# Patient Record
Sex: MALE | Race: AMERICAN INDIAN OR ALASKA NATIVE | Employment: UNEMPLOYED
[De-identification: names, ages, dates, MRNs, and addresses within clinical notes are randomized per-mention and may not be internally consistent; named-entity substitution may affect disease eponyms.]

---

## 2018-01-09 ENCOUNTER — RECORDS - HEALTHEAST (OUTPATIENT)
Dept: ADMINISTRATIVE | Facility: OTHER | Age: 53
End: 2018-01-09

## 2018-09-19 ENCOUNTER — RECORDS - HEALTHEAST (OUTPATIENT)
Dept: ADMINISTRATIVE | Facility: OTHER | Age: 53
End: 2018-09-19

## 2018-09-26 ENCOUNTER — HOSPITAL ENCOUNTER (OUTPATIENT)
Dept: MRI IMAGING | Facility: CLINIC | Age: 53
Discharge: HOME | End: 2018-09-26

## 2018-09-26 DIAGNOSIS — M48.061 LUMBAR STENOSIS: ICD-10-CM

## 2018-09-26 DIAGNOSIS — M54.16 LUMBAR RADICULOPATHY: ICD-10-CM

## 2019-01-09 ENCOUNTER — OFFICE VISIT - HEALTHEAST (OUTPATIENT)
Dept: NEUROSURGERY | Facility: CLINIC | Age: 54
End: 2019-01-09

## 2019-01-09 DIAGNOSIS — M43.17 SPONDYLOLISTHESIS OF LUMBOSACRAL REGION: ICD-10-CM

## 2019-01-09 ASSESSMENT — MIFFLIN-ST. JEOR: SCORE: 1914.01

## 2019-02-15 ENCOUNTER — RECORDS - HEALTHEAST (OUTPATIENT)
Dept: ADMINISTRATIVE | Facility: OTHER | Age: 54
End: 2019-02-15

## 2019-03-12 ENCOUNTER — OFFICE VISIT - HEALTHEAST (OUTPATIENT)
Dept: NEUROSURGERY | Facility: CLINIC | Age: 54
End: 2019-03-12

## 2019-03-12 DIAGNOSIS — M43.17 SPONDYLOLISTHESIS OF LUMBOSACRAL REGION: ICD-10-CM

## 2019-03-12 ASSESSMENT — MIFFLIN-ST. JEOR: SCORE: 1914.01

## 2019-03-29 ENCOUNTER — AMBULATORY - HEALTHEAST (OUTPATIENT)
Dept: NEUROSURGERY | Facility: CLINIC | Age: 54
End: 2019-03-29

## 2019-03-29 DIAGNOSIS — Z01.818 PRE-OP EXAM: ICD-10-CM

## 2019-04-01 ENCOUNTER — RECORDS - HEALTHEAST (OUTPATIENT)
Dept: ADMINISTRATIVE | Facility: OTHER | Age: 54
End: 2019-04-01

## 2019-04-01 ENCOUNTER — COMMUNICATION - HEALTHEAST (OUTPATIENT)
Dept: NEUROSURGERY | Facility: CLINIC | Age: 54
End: 2019-04-01

## 2019-04-02 ENCOUNTER — COMMUNICATION - HEALTHEAST (OUTPATIENT)
Dept: NEUROSURGERY | Facility: CLINIC | Age: 54
End: 2019-04-02

## 2019-05-06 ENCOUNTER — AMBULATORY - HEALTHEAST (OUTPATIENT)
Dept: LAB | Facility: HOSPITAL | Age: 54
End: 2019-05-06

## 2019-05-06 DIAGNOSIS — Z01.818 PRE-OP EXAM: ICD-10-CM

## 2019-05-06 LAB
ALBUMIN UR-MCNC: NEGATIVE MG/DL
ANION GAP SERPL CALCULATED.3IONS-SCNC: 6 MMOL/L (ref 5–18)
APPEARANCE UR: CLEAR
APTT PPP: 28 SECONDS (ref 24–37)
BACTERIA #/AREA URNS HPF: ABNORMAL HPF
BILIRUB UR QL STRIP: NEGATIVE
BUN SERPL-MCNC: 11 MG/DL (ref 8–22)
CALCIUM SERPL-MCNC: 9.4 MG/DL (ref 8.5–10.5)
CHLORIDE BLD-SCNC: 107 MMOL/L (ref 98–107)
CLOSURE TME COLL+ADP BLD: 90 SEC (ref 1–110)
CLOSURE TME COLL+EPINEP BLD: >300 SEC (ref 1–180)
CO2 SERPL-SCNC: 30 MMOL/L (ref 22–31)
COLOR UR AUTO: YELLOW
CREAT SERPL-MCNC: 0.87 MG/DL (ref 0.7–1.3)
ERYTHROCYTE [DISTWIDTH] IN BLOOD BY AUTOMATED COUNT: 12.7 % (ref 11–14.5)
GFR SERPL CREATININE-BSD FRML MDRD: >60 ML/MIN/1.73M2
GLUCOSE BLD-MCNC: 109 MG/DL (ref 70–125)
GLUCOSE UR STRIP-MCNC: NEGATIVE MG/DL
HCT VFR BLD AUTO: 40.3 % (ref 40–54)
HGB BLD-MCNC: 13.6 G/DL (ref 14–18)
HGB UR QL STRIP: NEGATIVE
INR PPP: 0.98 (ref 0.9–1.1)
KETONES UR STRIP-MCNC: NEGATIVE MG/DL
LEUKOCYTE ESTERASE UR QL STRIP: NEGATIVE
MCH RBC QN AUTO: 30.3 PG (ref 27–34)
MCHC RBC AUTO-ENTMCNC: 33.7 G/DL (ref 32–36)
MCV RBC AUTO: 90 FL (ref 80–100)
MUCOUS THREADS #/AREA URNS LPF: ABNORMAL LPF
NITRATE UR QL: NEGATIVE
PH UR STRIP: 6.5 [PH] (ref 4.5–8)
PLATELET # BLD AUTO: 180 THOU/UL (ref 140–440)
PMV BLD AUTO: 9.8 FL (ref 8.5–12.5)
POTASSIUM BLD-SCNC: 4.2 MMOL/L (ref 3.5–5)
RBC # BLD AUTO: 4.49 MILL/UL (ref 4.4–6.2)
RBC #/AREA URNS AUTO: ABNORMAL HPF
SODIUM SERPL-SCNC: 143 MMOL/L (ref 136–145)
SP GR UR STRIP: 1.01 (ref 1–1.03)
SQUAMOUS #/AREA URNS AUTO: ABNORMAL LPF
UROBILINOGEN UR STRIP-ACNC: ABNORMAL
WBC #/AREA URNS AUTO: ABNORMAL HPF
WBC: 6 THOU/UL (ref 4–11)

## 2019-05-20 ENCOUNTER — ANESTHESIA - HEALTHEAST (OUTPATIENT)
Dept: SURGERY | Facility: CLINIC | Age: 54
End: 2019-05-20

## 2019-05-20 ENCOUNTER — COMMUNICATION - HEALTHEAST (OUTPATIENT)
Dept: NEUROSURGERY | Facility: CLINIC | Age: 54
End: 2019-05-20

## 2019-05-21 ENCOUNTER — SURGERY - HEALTHEAST (OUTPATIENT)
Dept: SURGERY | Facility: CLINIC | Age: 54
End: 2019-05-21

## 2019-05-21 ENCOUNTER — RECORDS - HEALTHEAST (OUTPATIENT)
Dept: ADMINISTRATIVE | Facility: OTHER | Age: 54
End: 2019-05-21

## 2019-05-21 ASSESSMENT — MIFFLIN-ST. JEOR: SCORE: 1909.48

## 2019-05-24 ENCOUNTER — COMMUNICATION - HEALTHEAST (OUTPATIENT)
Dept: NEUROSURGERY | Facility: CLINIC | Age: 54
End: 2019-05-24

## 2019-05-24 DIAGNOSIS — Z98.1 S/P LUMBAR FUSION: ICD-10-CM

## 2019-05-24 DIAGNOSIS — M43.16 SPONDYLOLISTHESIS OF LUMBAR REGION: ICD-10-CM

## 2019-06-04 ENCOUNTER — AMBULATORY - HEALTHEAST (OUTPATIENT)
Dept: NEUROSURGERY | Facility: CLINIC | Age: 54
End: 2019-06-04

## 2019-06-04 DIAGNOSIS — Z48.02 ENCOUNTER FOR STAPLE REMOVAL: ICD-10-CM

## 2019-06-20 ENCOUNTER — RECORDS - HEALTHEAST (OUTPATIENT)
Dept: ADMINISTRATIVE | Facility: OTHER | Age: 54
End: 2019-06-20

## 2019-07-02 ENCOUNTER — OFFICE VISIT - HEALTHEAST (OUTPATIENT)
Dept: NEUROSURGERY | Facility: CLINIC | Age: 54
End: 2019-07-02

## 2019-07-02 DIAGNOSIS — Z98.1 S/P LUMBAR FUSION: ICD-10-CM

## 2019-12-27 ENCOUNTER — COMMUNICATION - HEALTHEAST (OUTPATIENT)
Dept: NEUROSURGERY | Facility: CLINIC | Age: 54
End: 2019-12-27

## 2019-12-31 ENCOUNTER — RECORDS - HEALTHEAST (OUTPATIENT)
Dept: ADMINISTRATIVE | Facility: OTHER | Age: 54
End: 2019-12-31

## 2020-06-29 ENCOUNTER — COMMUNICATION - HEALTHEAST (OUTPATIENT)
Dept: NEUROSURGERY | Facility: CLINIC | Age: 55
End: 2020-06-29

## 2020-07-22 ENCOUNTER — RECORDS - HEALTHEAST (OUTPATIENT)
Dept: ADMINISTRATIVE | Facility: OTHER | Age: 55
End: 2020-07-22

## 2020-07-31 ENCOUNTER — COMMUNICATION - HEALTHEAST (OUTPATIENT)
Dept: FAMILY MEDICINE | Facility: CLINIC | Age: 55
End: 2020-07-31

## 2020-08-13 ENCOUNTER — COMMUNICATION - HEALTHEAST (OUTPATIENT)
Dept: TELEHEALTH | Facility: CLINIC | Age: 55
End: 2020-08-13

## 2020-08-13 ENCOUNTER — OFFICE VISIT - HEALTHEAST (OUTPATIENT)
Dept: INTERNAL MEDICINE | Facility: CLINIC | Age: 55
End: 2020-08-13

## 2020-08-13 DIAGNOSIS — M25.552 HIP PAIN, LEFT: ICD-10-CM

## 2020-08-13 DIAGNOSIS — M79.671 RIGHT FOOT PAIN: ICD-10-CM

## 2020-08-13 DIAGNOSIS — R73.03 PREDIABETES: ICD-10-CM

## 2020-08-13 DIAGNOSIS — E66.811 CLASS 1 OBESITY DUE TO EXCESS CALORIES WITHOUT SERIOUS COMORBIDITY WITH BODY MASS INDEX (BMI) OF 33.0 TO 33.9 IN ADULT: ICD-10-CM

## 2020-08-13 DIAGNOSIS — E66.09 CLASS 1 OBESITY DUE TO EXCESS CALORIES WITHOUT SERIOUS COMORBIDITY WITH BODY MASS INDEX (BMI) OF 33.0 TO 33.9 IN ADULT: ICD-10-CM

## 2020-08-13 DIAGNOSIS — Z12.5 PROSTATE CANCER SCREENING: ICD-10-CM

## 2020-08-13 LAB
ANION GAP SERPL CALCULATED.3IONS-SCNC: 8 MMOL/L (ref 5–18)
BUN SERPL-MCNC: 11 MG/DL (ref 8–22)
CALCIUM SERPL-MCNC: 9.4 MG/DL (ref 8.5–10.5)
CHLORIDE BLD-SCNC: 105 MMOL/L (ref 98–107)
CHOLEST SERPL-MCNC: 175 MG/DL
CO2 SERPL-SCNC: 26 MMOL/L (ref 22–31)
CREAT SERPL-MCNC: 1.2 MG/DL (ref 0.7–1.3)
CREAT UR-MCNC: 113.9 MG/DL
FASTING STATUS PATIENT QL REPORTED: NO
GFR SERPL CREATININE-BSD FRML MDRD: >60 ML/MIN/1.73M2
GLUCOSE BLD-MCNC: 110 MG/DL (ref 70–125)
HBA1C MFR BLD: 5.9 %
HDLC SERPL-MCNC: 43 MG/DL
LDLC SERPL CALC-MCNC: 104 MG/DL
LDLC SERPL CALC-MCNC: ABNORMAL MG/DL
MICROALBUMIN UR-MCNC: <0.5 MG/DL (ref 0–1.99)
MICROALBUMIN/CREAT UR: NORMAL MG/G{CREAT}
POTASSIUM BLD-SCNC: 4.9 MMOL/L (ref 3.5–5)
PSA SERPL-MCNC: 0.6 NG/ML (ref 0–3.5)
SODIUM SERPL-SCNC: 139 MMOL/L (ref 136–145)
TRIGL SERPL-MCNC: 446 MG/DL

## 2020-08-13 ASSESSMENT — MIFFLIN-ST. JEOR: SCORE: 1911.74

## 2020-08-21 ENCOUNTER — COMMUNICATION - HEALTHEAST (OUTPATIENT)
Dept: INTERNAL MEDICINE | Facility: CLINIC | Age: 55
End: 2020-08-21

## 2020-09-14 ENCOUNTER — COMMUNICATION - HEALTHEAST (OUTPATIENT)
Dept: VASCULAR SURGERY | Facility: CLINIC | Age: 55
End: 2020-09-14

## 2020-12-28 PROBLEM — I10 IDIOPATHIC HYPERTENSION: Status: ACTIVE | Noted: 2020-12-28

## 2020-12-28 PROBLEM — M54.16 LUMBAR RADICULOPATHY: Status: ACTIVE | Noted: 2020-12-28

## 2020-12-28 PROBLEM — E11.9 TYPE 2 DIABETES MELLITUS WITHOUT COMPLICATION, WITHOUT LONG-TERM CURRENT USE OF INSULIN (H): Status: ACTIVE | Noted: 2020-12-28

## 2020-12-28 PROBLEM — M43.10 SPONDYLOLISTHESIS: Status: ACTIVE | Noted: 2019-05-21

## 2020-12-28 PROBLEM — R50.9 FEVER: Status: ACTIVE | Noted: 2019-05-24

## 2020-12-28 RX ORDER — VENLAFAXINE 100 MG/1
100 TABLET ORAL 3 TIMES DAILY
COMMUNITY
Start: 2020-12-28 | End: 2020-12-30

## 2020-12-28 RX ORDER — CLONIDINE HYDROCHLORIDE 0.3 MG/1
0.3 TABLET ORAL DAILY
COMMUNITY
Start: 2020-06-25 | End: 2021-10-13

## 2020-12-28 RX ORDER — CALCIUM POLYCARBOPHIL 625 MG
625 TABLET ORAL DAILY
COMMUNITY
End: 2020-12-30

## 2020-12-28 RX ORDER — ACETAMINOPHEN 325 MG/1
650 TABLET ORAL EVERY 6 HOURS PRN
COMMUNITY
Start: 2020-12-28 | End: 2020-12-30

## 2020-12-28 RX ORDER — LURASIDONE HYDROCHLORIDE 40 MG/1
40 TABLET, FILM COATED ORAL DAILY
COMMUNITY
Start: 2020-08-03 | End: 2021-10-13

## 2020-12-28 RX ORDER — BENZALKONIUM CHLORIDE 1.3 MG/ML
CLOTH TOPICAL
COMMUNITY
Start: 2020-07-02

## 2020-12-28 RX ORDER — BUPROPION HYDROCHLORIDE 300 MG/1
300 TABLET ORAL DAILY
COMMUNITY
Start: 2020-08-03 | End: 2021-10-13

## 2020-12-28 RX ORDER — DOCUSATE SODIUM 100 MG/1
1 CAPSULE, LIQUID FILLED ORAL DAILY
COMMUNITY
Start: 2020-07-26 | End: 2020-12-30

## 2020-12-28 SDOH — HEALTH STABILITY: MENTAL HEALTH: HOW MANY STANDARD DRINKS CONTAINING ALCOHOL DO YOU HAVE ON A TYPICAL DAY?: NOT ASKED

## 2020-12-28 SDOH — HEALTH STABILITY: MENTAL HEALTH: HOW OFTEN DO YOU HAVE A DRINK CONTAINING ALCOHOL?: NEVER

## 2020-12-28 SDOH — HEALTH STABILITY: MENTAL HEALTH: HOW OFTEN DO YOU HAVE 6 OR MORE DRINKS ON ONE OCCASION?: NEVER

## 2020-12-30 ENCOUNTER — OFFICE VISIT (OUTPATIENT)
Dept: FAMILY MEDICINE | Facility: OTHER | Age: 55
End: 2020-12-30
Attending: FAMILY MEDICINE
Payer: COMMERCIAL

## 2020-12-30 VITALS
DIASTOLIC BLOOD PRESSURE: 80 MMHG | WEIGHT: 226 LBS | HEART RATE: 72 BPM | RESPIRATION RATE: 16 BRPM | TEMPERATURE: 97.2 F | SYSTOLIC BLOOD PRESSURE: 124 MMHG | HEIGHT: 71 IN | BODY MASS INDEX: 31.64 KG/M2 | OXYGEN SATURATION: 98 %

## 2020-12-30 DIAGNOSIS — N52.9 IMPOTENCE OF ORGANIC ORIGIN: ICD-10-CM

## 2020-12-30 DIAGNOSIS — E11.9 TYPE 2 DIABETES MELLITUS WITHOUT COMPLICATION, WITHOUT LONG-TERM CURRENT USE OF INSULIN (H): Primary | ICD-10-CM

## 2020-12-30 DIAGNOSIS — Z98.1 S/P LUMBAR FUSION: ICD-10-CM

## 2020-12-30 DIAGNOSIS — M12.811 ROTATOR CUFF ARTHROPATHY OF RIGHT SHOULDER: ICD-10-CM

## 2020-12-30 PROBLEM — R50.9 FEVER: Status: RESOLVED | Noted: 2019-05-24 | Resolved: 2020-12-30

## 2020-12-30 LAB
ALBUMIN SERPL-MCNC: 4 G/DL (ref 3.5–5.7)
ALP SERPL-CCNC: 78 U/L (ref 34–104)
ALT SERPL W P-5'-P-CCNC: 65 U/L (ref 7–52)
ANION GAP SERPL CALCULATED.3IONS-SCNC: 5 MMOL/L (ref 3–14)
AST SERPL W P-5'-P-CCNC: 41 U/L (ref 13–39)
BILIRUB SERPL-MCNC: 0.5 MG/DL (ref 0.3–1)
BUN SERPL-MCNC: 12 MG/DL (ref 7–25)
CALCIUM SERPL-MCNC: 8.7 MG/DL (ref 8.6–10.3)
CHLORIDE SERPL-SCNC: 108 MMOL/L (ref 98–107)
CO2 SERPL-SCNC: 28 MMOL/L (ref 21–31)
CREAT SERPL-MCNC: 1.25 MG/DL (ref 0.7–1.3)
GFR SERPL CREATININE-BSD FRML MDRD: 60 ML/MIN/{1.73_M2}
GLUCOSE SERPL-MCNC: 85 MG/DL (ref 70–105)
HBA1C MFR BLD: 6.6 % (ref 4–6)
POTASSIUM SERPL-SCNC: 4.2 MMOL/L (ref 3.5–5.1)
PROT SERPL-MCNC: 7.1 G/DL (ref 6.4–8.9)
SODIUM SERPL-SCNC: 141 MMOL/L (ref 134–144)

## 2020-12-30 PROCEDURE — 80053 COMPREHEN METABOLIC PANEL: CPT | Mod: ZL | Performed by: FAMILY MEDICINE

## 2020-12-30 PROCEDURE — 36415 COLL VENOUS BLD VENIPUNCTURE: CPT | Mod: ZL | Performed by: FAMILY MEDICINE

## 2020-12-30 PROCEDURE — G0463 HOSPITAL OUTPT CLINIC VISIT: HCPCS

## 2020-12-30 PROCEDURE — 83036 HEMOGLOBIN GLYCOSYLATED A1C: CPT | Mod: ZL | Performed by: FAMILY MEDICINE

## 2020-12-30 PROCEDURE — 99204 OFFICE O/P NEW MOD 45 MIN: CPT | Performed by: FAMILY MEDICINE

## 2020-12-30 RX ORDER — SILDENAFIL 50 MG/1
50 TABLET, FILM COATED ORAL DAILY PRN
Qty: 12 TABLET | Refills: 1 | Status: SHIPPED | OUTPATIENT
Start: 2020-12-30 | End: 2021-02-16

## 2020-12-30 RX ORDER — ATORVASTATIN CALCIUM 20 MG/1
20 TABLET, FILM COATED ORAL DAILY
Qty: 90 TABLET | Refills: 3 | Status: SHIPPED | OUTPATIENT
Start: 2020-12-30 | End: 2021-02-16

## 2020-12-30 ASSESSMENT — ANXIETY QUESTIONNAIRES
6. BECOMING EASILY ANNOYED OR IRRITABLE: NOT AT ALL
3. WORRYING TOO MUCH ABOUT DIFFERENT THINGS: NOT AT ALL
5. BEING SO RESTLESS THAT IT IS HARD TO SIT STILL: NOT AT ALL
2. NOT BEING ABLE TO STOP OR CONTROL WORRYING: NOT AT ALL
7. FEELING AFRAID AS IF SOMETHING AWFUL MIGHT HAPPEN: NOT AT ALL
GAD7 TOTAL SCORE: 0
1. FEELING NERVOUS, ANXIOUS, OR ON EDGE: NOT AT ALL

## 2020-12-30 ASSESSMENT — PATIENT HEALTH QUESTIONNAIRE - PHQ9
SUM OF ALL RESPONSES TO PHQ QUESTIONS 1-9: 0
5. POOR APPETITE OR OVEREATING: NOT AT ALL

## 2020-12-30 ASSESSMENT — MIFFLIN-ST. JEOR: SCORE: 1879.32

## 2020-12-30 ASSESSMENT — PAIN SCALES - GENERAL: PAINLEVEL: MODERATE PAIN (5)

## 2020-12-30 NOTE — LETTER
December 31, 2020      Lucas Lim  [facility] [address] [address]        Dear ,    We are writing to inform you of your test results.    As you can see your A1c did come back normal but your liver function tests were slightly elevated.  There can be a number of causes.  We can discuss the potential causes at your next visit.    Resulted Orders   Comprehensive Metabolic Panel   Result Value Ref Range    Sodium 141 134 - 144 mmol/L    Potassium 4.2 3.5 - 5.1 mmol/L    Chloride 108 (H) 98 - 107 mmol/L    Carbon Dioxide 28 21 - 31 mmol/L    Anion Gap 5 3 - 14 mmol/L    Glucose 85 70 - 105 mg/dL    Urea Nitrogen 12 7 - 25 mg/dL    Creatinine 1.25 0.70 - 1.30 mg/dL    GFR Estimate 60 (L) >60 mL/min/[1.73_m2]    GFR Estimate If Black 73 >60 mL/min/[1.73_m2]    Calcium 8.7 8.6 - 10.3 mg/dL    Bilirubin Total 0.5 0.3 - 1.0 mg/dL    Albumin 4.0 3.5 - 5.7 g/dL    Protein Total 7.1 6.4 - 8.9 g/dL    Alkaline Phosphatase 78 34 - 104 U/L    ALT 65 (H) 7 - 52 U/L    AST 41 (H) 13 - 39 U/L   Hemoglobin A1c   Result Value Ref Range    Hemoglobin A1C 6.6 (H) 4.0 - 6.0 %       If you have any questions or concerns, please call the clinic at the number listed above.       Sincerely,      Anson Rain MD

## 2020-12-30 NOTE — NURSING NOTE
Patient here for back pain after a fusion of L5 and S1 in 2019. He has not followed up with MD since Fusion. He would like to get a new prescription for metformin his last A!C 5.9 in August 2020 he was on the metformin. Also concerns with ED and a torn right rotator cuff. Medication Reconciliation: complete.    Abi White LPN  12/30/2020 2:20 PM

## 2020-12-31 PROBLEM — M12.811 ROTATOR CUFF ARTHROPATHY OF RIGHT SHOULDER: Status: ACTIVE | Noted: 2020-12-31

## 2020-12-31 ASSESSMENT — ANXIETY QUESTIONNAIRES: GAD7 TOTAL SCORE: 0

## 2020-12-31 NOTE — PROGRESS NOTES
SUBJECTIVE:   Lucas Lim is a 55 year old male who presents to clinic today for the following health issues: Back pain.  Erectile dysfunction.  Metformin refilled.  Shoulder pain.    Patient arrives here with the above complaints patient typically gets his care through [facility] [place] [facility].  He reports a history of back fusion in 2019.  He reports he periodically gets pain.  In the past he is used Naprosyn which reports it does not seem to help.  He also reports taking Mobic.  Most of times is okay unless he does any significant activity.  He currently rates the pain as a 5 out of 10.  He is also use gabapentin which is made him sleepy.  And Lyrica.  Is  is reviewed  Total Private Pay: 0    Fill Date ID   Written Drug Qty Days Prescriber Rx # Pharmacy Refill   Daily Dose* Pymt Type     11/27/2020  1   11/24/2020  Acetamin[facility]Cod #3 [facility]  [facility]   [id number]   [id number]   0  27.00 MME      Patient is also requesting a medication for ED.  He reports he is having increasing trouble with erectile dysfunction.  He has never been on any medications in the past.  He has shoulder pain.  Patient has had shots in the past.  Typically for a year.  He reports no history of MRI but he has had shoulder [facility].  He reports knowledge of grinding.  He has trouble with reaching above his head associated with pain.  Patient also has a history of diabetes.  And wishes a refill of his Metformin.  No recent A1c done.          Patient Active Problem List    Diagnosis Date Noted     Rotator cuff arthropathy of right shoulder 12/31/2020     Priority: Medium     S/P lumbar fusion 12/30/2020     Priority: Medium     Idiopathic hypertension 12/28/2020     Priority: Medium     Lumbar radiculopathy 12/28/2020     Priority: Medium     Type 2 diabetes mellitus without complication, without long-term current use of insulin (H) 12/28/2020     Priority: Medium     Spondylolisthesis 05/21/2019     Priority:  "Medium     Alcoholism (H) 12/31/2009     Priority: Medium     Sleep apnea 12/31/2009     Priority: Medium     Past Medical History:   Diagnosis Date     Acquired spondylolisthesis      Arthritis      Chronic low back pain      Diabetes mellitus (H)      Essential hypertension      Fever and chills      Lumbar radiculopathy      Major depressive disorder with current active episode      Mood swings      JERMAINE (obstructive sleep apnea)      Screening for hyperlipidemia      Tear of right rotator cuff      Type 2 diabetes mellitus without complication, without long-term current use of insulin (H)       Past Surgical History:   Procedure Laterality Date     HAND SURGERY Right     per care everywhere     SKIN GRAFT, EACH ADDN 100SQCM Left     per care everywhere     Family History   Problem Relation Age of Onset     Liver Disease Mother         alcohol     Myocardial Infarction Father      No Known Allergies    Review of Systems     OBJECTIVE:     /80   Pulse 72   Temp 97.2  F (36.2  C)   Resp 16   Ht 1.791 m (5' 10.5\")   Wt 102.5 kg (226 lb)   SpO2 98%   BMI 31.97 kg/m    Body mass index is 31.97 kg/m .  Physical Exam  Constitutional:       Appearance: Normal appearance.   Cardiovascular:      Rate and Rhythm: Normal rate and regular rhythm.      Heart sounds: No murmur.   Pulmonary:      Effort: Pulmonary effort is normal.      Breath sounds: Normal breath sounds.   Musculoskeletal: Normal range of motion.         General: Tenderness present. No swelling or deformity.      Comments: Patient has good range of motion associated with pain with abduction past 90 degrees.  Weakness with internal or external rotation to the right shoulder   Skin:     General: Skin is warm.   Neurological:      General: No focal deficit present.      Mental Status: He is alert.   Psychiatric:         Mood and Affect: Mood normal.         Diagnostic Test Results:  Results for orders placed or performed in visit on 12/30/20 "   Comprehensive Metabolic Panel     Status: Abnormal   Result Value Ref Range    Sodium 141 134 - 144 mmol/L    Potassium 4.2 3.5 - 5.1 mmol/L    Chloride 108 (H) 98 - 107 mmol/L    Carbon Dioxide 28 21 - 31 mmol/L    Anion Gap 5 3 - 14 mmol/L    Glucose 85 70 - 105 mg/dL    Urea Nitrogen 12 7 - 25 mg/dL    Creatinine 1.25 0.70 - 1.30 mg/dL    GFR Estimate 60 (L) >60 mL/min/[1.73_m2]    GFR Estimate If Black 73 >60 mL/min/[1.73_m2]    Calcium 8.7 8.6 - 10.3 mg/dL    Bilirubin Total 0.5 0.3 - 1.0 mg/dL    Albumin 4.0 3.5 - 5.7 g/dL    Protein Total 7.1 6.4 - 8.9 g/dL    Alkaline Phosphatase 78 34 - 104 U/L    ALT 65 (H) 7 - 52 U/L    AST 41 (H) 13 - 39 U/L   Hemoglobin A1c     Status: Abnormal   Result Value Ref Range    Hemoglobin A1C 6.6 (H) 4.0 - 6.0 %       ASSESSMENT/PLAN:         1. S/P lumbar fusion  Trial of Feldene.  Hopefully that will also improve his shoulder pain.  Advised to get records from [facility] and follow-up here in the clinic in about a month.  May need further evaluation of the shoulder.  Including MRI possible referral  - piroxicam (FELDENE) 20 MG capsule; Take 1 capsule (20 mg) by mouth daily Prn  Dispense: 30 capsule; Refill: 4    2. Type 2 diabetes mellitus without complication, without long-term current use of insulin (H)  Currently under fair control medications refilled.  - metFORMIN (GLUCOPHAGE) 1000 MG tablet; Take 1 tablet (1,000 mg) by mouth daily (with breakfast)  Dispense: 90 tablet; Refill: 3  - Hemoglobin A1c; Future  - Comprehensive Metabolic Panel; Future  - atorvastatin (LIPITOR) 20 MG tablet; Take 1 tablet (20 mg) by mouth daily  Dispense: 90 tablet; Refill: 3  - Comprehensive Metabolic Panel  - Hemoglobin A1c    3. Impotence of organic origin  Trial.  Follow-up in 1 month  - sildenafil (VIAGRA) 50 MG tablet; Take 1 tablet (50 mg) by mouth daily as needed  Dispense: 12 tablet; Refill: 1    Elevated LFTs.  Will discuss potential causes at his next visit.  Anson Rain,  MD [facility]

## 2021-02-16 ENCOUNTER — HOSPITAL ENCOUNTER (OUTPATIENT)
Dept: GENERAL RADIOLOGY | Facility: OTHER | Age: 56
End: 2021-02-16
Attending: FAMILY MEDICINE
Payer: MEDICAID

## 2021-02-16 ENCOUNTER — OFFICE VISIT (OUTPATIENT)
Dept: FAMILY MEDICINE | Facility: OTHER | Age: 56
End: 2021-02-16
Attending: FAMILY MEDICINE
Payer: MEDICAID

## 2021-02-16 VITALS
TEMPERATURE: 98 F | SYSTOLIC BLOOD PRESSURE: 102 MMHG | HEIGHT: 70 IN | WEIGHT: 223.4 LBS | HEART RATE: 90 BPM | RESPIRATION RATE: 16 BRPM | OXYGEN SATURATION: 97 % | DIASTOLIC BLOOD PRESSURE: 70 MMHG | BODY MASS INDEX: 31.98 KG/M2

## 2021-02-16 DIAGNOSIS — M12.811 ROTATOR CUFF ARTHROPATHY OF RIGHT SHOULDER: Primary | ICD-10-CM

## 2021-02-16 DIAGNOSIS — N52.9 IMPOTENCE OF ORGANIC ORIGIN: ICD-10-CM

## 2021-02-16 DIAGNOSIS — Z98.1 S/P LUMBAR FUSION: ICD-10-CM

## 2021-02-16 DIAGNOSIS — E11.9 TYPE 2 DIABETES MELLITUS WITHOUT COMPLICATION, WITHOUT LONG-TERM CURRENT USE OF INSULIN (H): ICD-10-CM

## 2021-02-16 DIAGNOSIS — M19.011 PRIMARY OSTEOARTHRITIS OF RIGHT SHOULDER: ICD-10-CM

## 2021-02-16 DIAGNOSIS — M12.811 ROTATOR CUFF ARTHROPATHY OF RIGHT SHOULDER: ICD-10-CM

## 2021-02-16 PROCEDURE — 73030 X-RAY EXAM OF SHOULDER: CPT | Mod: RT

## 2021-02-16 PROCEDURE — G0463 HOSPITAL OUTPT CLINIC VISIT: HCPCS | Mod: 25

## 2021-02-16 PROCEDURE — 99214 OFFICE O/P EST MOD 30 MIN: CPT | Performed by: FAMILY MEDICINE

## 2021-02-16 PROCEDURE — G0463 HOSPITAL OUTPT CLINIC VISIT: HCPCS

## 2021-02-16 RX ORDER — ATORVASTATIN CALCIUM 20 MG/1
20 TABLET, FILM COATED ORAL DAILY
Qty: 90 TABLET | Refills: 3 | Status: SHIPPED | OUTPATIENT
Start: 2021-02-16 | End: 2021-10-13

## 2021-02-16 RX ORDER — SILDENAFIL 50 MG/1
50 TABLET, FILM COATED ORAL DAILY PRN
Qty: 12 TABLET | Refills: 3 | Status: SHIPPED | OUTPATIENT
Start: 2021-02-16 | End: 2021-10-13

## 2021-02-16 ASSESSMENT — MIFFLIN-ST. JEOR: SCORE: 1859.59

## 2021-02-16 ASSESSMENT — PAIN SCALES - GENERAL: PAINLEVEL: EXTREME PAIN (8)

## 2021-02-16 NOTE — NURSING NOTE
Patient here for recheck on right shoulder pain and back pain. The pain in right shoulder feels like he dislocated it with a torn rotator cuff. There is grinding and cracking with popping. He would like his refills sents to [facility] in [place] with a referral so he can fill them there. Medication Reconciliation: complete.    Abi White LPN  2/16/2021 1:27 PM

## 2021-02-17 NOTE — PROGRESS NOTES
SUBJECTIVE:   Lucas Lim is a 55 year old male who presents to clinic today for the following health issues: Follow-up right shoulder.  Refill his Lipitor and Viagra    Patient arrives here for follow-up right shoulder.  Patient reports that he was recently in bed change position.  He felt it dislocate.  He was able to push it against a corner and pop it back in.  Patient reports a long history of dislocations on and off.  Patient reports decreasing range of motion increasing pain.  He also would like a refill of his Viagra and Lipitor.  Patient does have a history of diabetes.  Viagra is working well.  Finally he is looking to have his light Lyrica refilled.  He has not had it for over a year.  His chart is reviewed showing that i he has been on it in the past.  Patient reports history of back surgery.  Lumbar fusion.        Patient Active Problem List    Diagnosis Date Noted     Rotator cuff arthropathy of right shoulder 12/31/2020     Priority: Medium     S/P lumbar fusion 12/30/2020     Priority: Medium     Idiopathic hypertension 12/28/2020     Priority: Medium     Lumbar radiculopathy 12/28/2020     Priority: Medium     Type 2 diabetes mellitus without complication, without long-term current use of insulin (H) 12/28/2020     Priority: Medium     Spondylolisthesis 05/21/2019     Priority: Medium     Alcoholism (H) 12/31/2009     Priority: Medium     Sleep apnea 12/31/2009     Priority: Medium     Past Medical History:   Diagnosis Date     Acquired spondylolisthesis      Arthritis      Chronic low back pain      Diabetes mellitus (H)      Essential hypertension      Fever and chills      Lumbar radiculopathy      Major depressive disorder with current active episode      Mood swings      JERMAINE (obstructive sleep apnea)      Screening for hyperlipidemia      Tear of right rotator cuff      Type 2 diabetes mellitus without complication, without long-term current use of insulin (H)       Current Outpatient  "Medications   Medication Sig Dispense Refill     atorvastatin (LIPITOR) 20 MG tablet Take 1 tablet (20 mg) by mouth daily 90 tablet 3     buPROPion (WELLBUTRIN XL) 300 MG 24 hr tablet Take 300 mg by mouth daily       cloNIDine (CATAPRES) 0.3 MG tablet Take 0.3 mg by mouth daily       lurasidone (LATUDA) 40 MG TABS tablet Take 40 mg by mouth daily       metFORMIN (GLUCOPHAGE) 1000 MG tablet Take 1 tablet (1,000 mg) by mouth daily (with breakfast) 90 tablet 3     piroxicam (FELDENE) 20 MG capsule Take 1 capsule (20 mg) by mouth daily Prn 30 capsule 4     Respiratory Therapy Supplies (CARETOUCH 2 CPAP HOSE ) MISC CPAP machine for home use at pressure 8-12cm H20, full face mask x1/3month with a full face cushion x1/mo       Respiratory Therapy Supplies (CARETOUCH UNIVERSL CPAP FILTER) MISC CPAP machine for home use at pressure 8-12cm H20, full face mask x1/3month with a full face cushion x1/mo       sildenafil (VIAGRA) 50 MG tablet Take 1 tablet (50 mg) by mouth daily as needed 12 tablet 3     No Known Allergies    Review of Systems     OBJECTIVE:     /70   Pulse 90   Temp 98  F (36.7  C)   Resp 16   Ht 1.778 m (5' 10\")   Wt 101.3 kg (223 lb 6.4 oz)   SpO2 97%   BMI 32.05 kg/m    Body mass index is 32.05 kg/m .  Physical Exam  Constitutional:       Appearance: Normal appearance.   Cardiovascular:      Rate and Rhythm: Normal rate.   Pulmonary:      Effort: Pulmonary effort is normal.      Breath sounds: Normal breath sounds.   Musculoskeletal:      Comments: Pain with passive range of motion to the right shoulder.   Neurological:      Mental Status: He is alert.   Psychiatric:         Mood and Affect: Mood normal.         Diagnostic Test Results:  Results for orders placed or performed during the hospital encounter of 02/16/21   XR Shoulder Right G/E 3 Views     Status: None    Narrative    PROCEDURE:  XR SHOULDER RT G/E 3 VW    HISTORY: Rotator cuff arthropathy of right shoulder.    COMPARISON:  " None.    TECHNIQUE:  3 views.    FINDINGS:  There is advanced arthritic changes of the AC joint and  moderate arthritic changes of the glenohumeral joint. There is a  linear calcification posterior to the humeral head on the axillary  view. This could represent calcific tendinitis or bursitis. He could  also represent an old chip or avulsion fracture. An acute fracture  cannot be entirely excluded but it has a sclerotic appearance.      Impression    IMPRESSION:   1. Advanced arthritic changes to the AC joint this could represent old  trauma.  2. Moderate to advanced arthritic changes of the glenohumeral joint.  3. Linear calcification posterior to the humeral head on the axillary  view. The differential would include calcific tendinitis or  peritendinitis but it could also represent a fracture, most likely old  but correlation recommended.  4. If further evaluation spelled indicated MRI could be obtained.      DAMIAN YEE MD       ASSESSMENT/PLAN:         1. Impotence of organic origin  Refill  - sildenafil (VIAGRA) 50 MG tablet; Take 1 tablet (50 mg) by mouth daily as needed  Dispense: 12 tablet; Refill: 3    2. Type 2 diabetes mellitus without complication, without long-term current use of insulin (H)  Refill  - atorvastatin (LIPITOR) 20 MG tablet; Take 1 tablet (20 mg) by mouth daily  Dispense: 90 tablet; Refill: 3    3. Rotator cuff arthropathy of right shoulder  X-rays show degenerative changes.  - XR Shoulder Right G/E 3 Views; Future    4. Primary osteoarthritis of right shoulder  We will refer to orthopedics  - Orthopedic & Spine  Referral; Future    5. S/P lumbar fusion  Charts reviewed.  There is a note about patient being in prison.  Is unclear why he was in prison.  Before prescribing Viagra will talk to him about the reason he was incarcerated.        Anson Rain MD  [facility]

## 2021-03-06 ENCOUNTER — HEALTH MAINTENANCE LETTER (OUTPATIENT)
Age: 56
End: 2021-03-06

## 2021-04-25 ENCOUNTER — HEALTH MAINTENANCE LETTER (OUTPATIENT)
Age: 56
End: 2021-04-25

## 2021-05-27 NOTE — TELEPHONE ENCOUNTER
ORDER FROM: Dr. Del Valle    PRE AUTHORIZATION: PA approved. Ok to schedule.    METHOD OF PATIENT CONTACT: Patient is [place]. [place] [facility] [place] [phone].    PROCEDURE: Lumbar 5-sacral 1 anterior lumbar interbody fusion with instrumentation; interbody graft, bone morphogenetic protein; posterior lateral fusion and instrumentation lumbar 5-sacral 1; instrumentation posterior; stealth    SURGICAL DATE: 5/21/19 @ 9:30 AM     READINESS VISIT: [place] [facility] @ [phone].    PCP, CLINIC, PHONE #: Dr. Rian Ryan, [facility] [phone].    FILM INFO: MRI LUMBAR: 9/26/18 @ [facility]    SURGICAL LETTER: Faxed to [facility] clinic @ [phone], Attn: Janeth on 4/01/19

## 2021-05-28 NOTE — TELEPHONE ENCOUNTER
Called Elysia at DOC, discussed surgery, post-op course, expectations, follow up plan for Lucas.    Reviewed H&P from 5/16/2019 - cleared for surgery  Labs, EKG - WNL (repeat PFT in am of sx)    MRI done on 9/26/2018 - in [place]    To OR as planned.     Check in - 0700    Nothing to eat or drink after midnight the night before surgery.     Bring all pertinent films to hospital the day of surgery.     Continue to refrain from NSAIDS (Ibuprofen, Aleve, Naprosyn), ASA, Over the counter herbal medications or supplements, anti-coagulants and blood thinners.       Instructions: using a washcloth and a bottle of provided Hibiclens, wash your body, avoiding your face and genitals. Preferably, shower the night before surgery and the morning of surgery using a half a bottle each time for your whole body shower.    Answered all questions.    Amber Simms RN, CNRN

## 2021-05-29 NOTE — PATIENT INSTRUCTIONS - HE
ACTIVITY:    * No lifting, pushing or pulling greater than 5-10 poundS (this is about a gallon of milk).  *No repetitive bending, twisting, or jarring activities  *No overhead work  *No aerobic or strenuous activity  *No activities with increased risk of falls  *You may move about your home as tolerated  *You may walk up and down stairs as tolerated  *You may increase your activity slowly over the next 4-6 weeks    WALKING PROGRAM: As you can tolerate, walk daily-start with 5-10 minutes of continuous walking. This is in addition to the walking that you do as part of your daily activities. Increase the time that you walk by 5 minutes every couple of days. Do not exceed 30-45 minutes of continuous walking until seen in follow-up. Walking is the best exercise after surgery.  **Listen to your body, if you find that you are more painful or fatigued, you may need to proceed more slowly.    **Do not smoke or expose yourself to second hand smoke. Cigarette smoke can delay healing and cause complications.     DRIVING:  We recommend that you do not drive while taking medications for pain or muscle spasms. Always read and follow the advice on your prescription bottle. If you have questions, speak with your pharmacist.  We recommend that you do not drive while wearing a brace, as it could limit your range of motion.    WORK: If you plan to return to work before you 4-6 weeks appointment, call and discuss with one of the nurses in the neurosurgery office.       WOUND CARE:    A dressing is not required.    Keep the wound clean.    Wash your hands before touching the wound.  Ensure that anyone assisting you in the care of your wound washes her/his hands before touching the wound. Good handwashing can decrease the risk of serious infection.    If you are unable to see your wound, have someone check the wound daily for redness, swelling,or drainage.     You may shower.  Pat the wound dry. Do not rub, SCRUB OR SOAK THE INCISIONS  UNTIL COMPLETELY.    No tub baths until the wound is well healed.  Usually 5-6 weeks.     If you develop redness, swelling, drainage, or temp 101 or greater, call our office at [phone].

## 2021-05-29 NOTE — ANESTHESIA CARE TRANSFER NOTE
Last vitals:   Vitals:    05/21/19 1535   BP: 106/53   Pulse: 70   Resp: 16   Temp: 36.4  C (97.5  F)   SpO2: 97%     Patient's level of consciousness is drowsy  Spontaneous respirations: yes  Maintains airway independently: yes  Dentition unchanged: yes  Oropharynx: oral airway in place    QCDR Measures:  ASA# 20 - Surgical Safety Checklist: WHO surgical safety checklist completed prior to induction    PQRS# 430 - Adult PONV Prevention: 4558F - Pt received => 2 anti-emetic agents (different classes) preop & intraop  ASA# 8 - Peds PONV Prevention: NA - Not pediatric patient, not GA or 2 or more risk factors NOT present  PQRS# 424 - Willa-op Temp Management: 4559F - At least one body temp DOCUMENTED => 35.5C or 95.9F within required timeframe  PQRS# 426 - PACU Transfer Protocol: - Transfer of care checklist used  ASA# 14 - Acute Post-op Pain: ASA14B - Patient did NOT experience pain >= 7 out of 10

## 2021-05-29 NOTE — PROGRESS NOTES
"Pt is here for staple removal s/p LUMBAR 5-SACRAL 1 ANTERIOR LUMBAR INTERBODY FUSION WITH INSTRUMENTATION; INTERBDOY GRAFT, BONE MORPHOGENIC PROTEIN; POSTERIOR LATERAL FUSION AND INSTRUMENTATION LUMBAR 5-SACRAL 1; INSTRUMENTATION POSTERIOR; STEALTH on 5-21-19 by Dr. Del Valle.   Before surgery, he reports he had constant \"10\" low back pain that radiated down the sides of both legs to the calves.  States he could barely walk due to pain.  Today, he reports \"it's like I have a brand new back!\". He can walk without assist device.  Reports only incisional pain taking oxy and flexeril two times a day.     Surgical wounds/anterior and posterior WNL - CDI, no signs of infection or skin breakdown.  Incision well-healed: good skin approximation, no redness or visible/palpable edema, no tenderness to palpation.  PT. AF, denies fever, chills or sweats.  Pt. reports that the symptoms are improved from pre-op.    Staples - intact removed without difficulty. Wound prepped with Betadine before and after removal.  Surrounding skin has no signs of breakdown.  Verbal instructions regarding incision care are given.  Pt. advised to call us if any s/s of infection noted - all discussed in details.      Lisa Phillips RN  "

## 2021-05-29 NOTE — ANESTHESIA PROCEDURE NOTES
Arterial Line  Reason for Procedure: hemodynamic monitoring and multiple ABGs  Patient location during procedure: Pre-op  Start time: 5/21/2019 9:29 AM  End time: 5/21/2019 9:39 AM  Staffing:  Performing  Anesthesiologist: Arnie Neves MD  Performing CRNA: Soniya Bowie CRNA  Sterile Precautions:  sterile barriers used during insertion: cap, mask, sterile gloves, large sheet, and hand hygiene used.  Arterial Line:   Immediately prior to procedure a time out was called to verify the correct patient, procedure, equipment, support staff and site/side marked as required  Laterality: right  Location: radial  Prepped with: ChloroPrep    Needle gauge: 20 G  Number of Attempts: 1  Secured with: tape, transparent dressing and pressure dressing  Flushed with: saline  1% lidocaine local anesthesia used for skin prep.   See MAR for additional medications given.

## 2021-05-29 NOTE — TELEPHONE ENCOUNTER
PATIENT NAME:  Lucas Lim Sr.  YOB: 1965  MRN: [id number]  SURGEON: JAC Del Valle  DATE of SURGERY: 5-21-19  PROCEDURE: LUMBAR 5-SACRAL 1 ANTERIOR LUMBAR INTERBODY FUSION WITH INSTRUMENTATION; INTERBDOY GRAFT, BONE MORPHOGENIC PROTEIN; POSTERIOR LATERAL FUSION AND INSTRUMENTATION LUMBAR 5-SACRAL 1; INSTRUMENTATION POSTERIOR; STEALTH    FOLLOW-UP:  Wound Check:  7 days [On nurse schedule unless noted otherwise]  Staples/Sutures Out : 14 Days [On nurse schedule unless noted otherwise]  Post Op Visit: 6+ weeks   Post-op Provider: NP  DIAGNOSTICS:  radiology: X-Ray: lumbar, ap/lat standing  (ordered 5-24-19)  DISPOSITION:  To [facility] 5-23-19    ADDITIONAL INSTRUCTIONS FOR MEDICAL STAFF:

## 2021-05-29 NOTE — ANESTHESIA POSTPROCEDURE EVALUATION
Patient: Lucas Lim Sr.  LUMBAR 5-SACRAL 1 ANTERIOR LUMBAR INTERBODY FUSION WITH INSTRUMENTATION; INTERBDOY GRAFT, BONE MORPHOGENIC PROTEIN; POSTERIOR LATERAL FUSION AND INSTRUMENTATION LUMBAR 5-SACRAL 1; INSTRUMENTATION POSTERIOR; STEALTH, EXPOSURE AND CLOSURE, LUMBAR ANTERIOR APPROACH, FOR SPINAL SURGERY, BY GENERAL SURGERY  Anesthesia type: general    Patient location: PACU  Last vitals:   Vitals Value Taken Time   /65 5/21/2019  7:21 PM   Temp 37.2  C (98.9  F) 5/21/2019  7:21 PM   Pulse 71 5/21/2019  7:21 PM   Resp 18 5/21/2019  7:21 PM   SpO2 97 % 5/21/2019  7:21 PM     Post vital signs: stable  Level of consciousness: awake and responds to simple questions  Post-anesthesia pain: pain controlled  Post-anesthesia nausea and vomiting: no  Pulmonary: unassisted, face mask  Cardiovascular: stable and blood pressure at baseline  Hydration: adequate  Anesthetic events: no    QCDR Measures:  ASA# 11 - Willa-op Cardiac Arrest: ASA11B - Patient did NOT experience unanticipated cardiac arrest  ASA# 12 - Willa-op Mortality Rate: ASA12B - Patient did NOT die  ASA# 13 - PACU Re-Intubation Rate: ASA13B - Patient did NOT require a new airway mgmt  ASA# 10 - Composite Anes Safety: ASA10A - No serious adverse event    Additional Notes:  Pt was slow to wake and required CPAP for a while.  Currently on oxymask.

## 2021-05-30 NOTE — PATIENT INSTRUCTIONS - HE
NEUROSURGERY:    You were seen today in follow up for your recent lumbar spinal fusion. You should begin Physical Therapy. You can begin increasing the amount of weight you lift slowly, by 5-10 lbs per week until you are at a comfortable baseline. If the increase in activity worsens back pain then back off slightly until the pain improves/ subsides.    Follow up with Neurosurgery in 6 months with repeat Xrays.     Ok for tylenol 650 mg every 4 hours as needed.    DO NOT TAKE  NSAIDs or STEROIDS until your surgeon approves (usually 6 months) after fusion surgery, as these medications may delay bone healing  NSAIDS include such drugs as:  Aspirin  Ibuprofen  Motrin  Advil  Aleeve  Naproxen     This  also includes NSAIDS that you apply to the skin such as Diclofenac gel     Steroids include drugs such as:  Prednisone  Decadron  Dexamethasone  Prednisolone  Hydrocortisone  And others     If you take Asprin for your heart or because you had a stroke--discuss with the neurosurgery office nurse before resuming.    If you are unsure if a medication is a NSAID or a steroid ask your pharmacist or call our office and discuss with a nurse      Dee Dee Juarez -[facility] Neurosurgery  [address] [place] [address], [address]  [place] [address]    O: [phone]  P: [phone]

## 2021-05-30 NOTE — PROGRESS NOTES
NEUROSURGERY FOLLOW UP EVALUATION:  The patient's attending neurosurgeon is Dr. Gigi Del Valle     ASSESSMENT:  53 yo male doing very well 12 weeks post anterior and posterior L5-S1 fusion. Plan to start PT, follow up 6 months postop with Xrays. Tylenol ok for discomfort. Continue to avoid NSAIDs      HPI:   Lucsa MELYSSA Lim Sr. is status post LUMBAR 5-SACRAL 1 ANTERIOR LUMBAR INTERBODY FUSION WITH INSTRUMENTATION; INTERBDOY GRAFT, BONE MORPHOGENIC PROTEIN; POSTERIOR LATERAL FUSION AND INSTRUMENTATION LUMBAR 5-SACRAL 1; INSTRUMENTATION POSTERIOR; STEALTH, EXPOSURE AND CLOSURE, LUMBAR ANTERIOR APPROACH, FOR SPINAL SURGERY, BY GENERAL SURGERY  by Dr. Gigi Del Valle on 5/21/2019. Patient initially presented with left leg pain, particularly while ambulating. This then became more severe and traveled to the right leg. He was found to have dynamic movement at L5-S1.    He was readmitted a day after discharge for fever noted at [facility]. He was observed overnight with no recurrence of fever and discharged.    Today he reports his pre operative leg pain has completely resolved. He has had no recurrence of fever. Has since been discharged from [facility] and is back with general population.     EXAM:  @VSR@     Alert and oriented x3  Motor Strength:  Biceps 5/5 right, 5/5 left   Triceps 5/5 right, 5/5 left   Deltoids 5/5 right, 5/5 left  Hand Grasp 5/5 right, 5/5 left  Hip flexors 5/5 right, 4+/5 left   Quadriceps: 5/5 right, 5/5 left   Ankle dorsiflexion: 5/5 right, 5/5 left   Extensor hallicus longus: 5/5 right, 5/5 left   Ankle plantar flexion : 5/5 right, 5/5 left     Bulk and tone: normal  Reflexes: biceps, triceps, brachioradialis, patellar and achilles without pathology.   No pathological reflexes   Gait steady, slightly wide based likely secondary to ankle shackles  Incision well healed     IMAGING:  The imaging was personally reviewed.  Hardware in good position      Dee Dee Juarez -[facility]  Ne[facility]surgery  [address], [address]  [place] [address]    O: [phone]  P: [phone]

## 2021-06-02 VITALS — WEIGHT: 232 LBS | BODY MASS INDEX: 32.48 KG/M2 | HEIGHT: 71 IN

## 2021-06-02 VITALS — HEIGHT: 71 IN | BODY MASS INDEX: 32.62 KG/M2 | WEIGHT: 233 LBS

## 2021-06-04 VITALS
OXYGEN SATURATION: 96 % | HEIGHT: 70 IN | HEART RATE: 82 BPM | BODY MASS INDEX: 33.79 KG/M2 | SYSTOLIC BLOOD PRESSURE: 126 MMHG | DIASTOLIC BLOOD PRESSURE: 76 MMHG | WEIGHT: 236 LBS

## 2021-06-04 NOTE — TELEPHONE ENCOUNTER
Dr. Guerra because patient is due for xray and office visit s/p lumbar fusion. He explained transporting the patient to our facility from [place] is quite the ordeal and wondered if he really needs to be seen here.     I conferred mani Clark and she stated the xrays can be done at the [facility] and films and report sent to us from Dr. Del Valle to review. Dr. Guerra was in agreement that this works for him as well.

## 2021-06-09 NOTE — TELEPHONE ENCOUNTER
Patient had lumbar fusion in 2019 with Dr. Del Valle while he was incarcerated.Patient stated he felt quite good after surgery but the back pain has gotten worse again. Last imaging was from [facility] while he was an inmate. Do we want patient to come in for 1 year post op? Do we need repeat imaging. Do we want patient to see NP or Dr. Gallegos?    Best Ph#: [phone]

## 2021-06-10 NOTE — TELEPHONE ENCOUNTER
See Dr Ashby's response on the xray report. Shows it was viewed by patient. I did tell patient the reason he was not called is because we can see that the message was viewed. I did read the message and asked patient to call back if he did not get his questions answered.    Mily Chen, [facility]

## 2021-06-10 NOTE — TELEPHONE ENCOUNTER
Patient stopped in saying he saw Dr Ashby last week for pain in his shoulder. He said Dr Ashby didn't take an xray even though he was told when scheduling that they would take one and doctor told patient he wanted to wait for results from the pain clinic to move forward with anything.     According to the patient, the results are in on mychart and he hasnt heard any follow up. He would like to be called ASAP to explain what is going okay    Okay to leave a detailed message.

## 2021-06-10 NOTE — PROGRESS NOTES
Office Visit - New Patient   Lucas Lim Sr.   55 y.o.  male    Date of visit: 8/16/2020  Physician: Abdifatah Ashby MD     Assessment and Plan   1. Prediabetes  3. Class 1 obesity due to excess calories without serious comorbidity with body mass index (BMI) of 33.0 to 33.9 in adult  Very well controlled.  BMP within normal limits and A1c 5.9%.  We will continue lifestyle changes.  Encouraged increased activity and weight loss.  Also provided patient instructions for lifestyle changes to help keep his blood pressure low.  - Glycosylated Hemoglobin A1c  - Microalbumin, Random Urine  - Basic Metabolic Panel  - Lipid Cascade  - Custom LDL Cholesterol, Direct    2. Prostate cancer screening  - PSA, Annual Screen (Prostatic-Specific Antigen)    4. Right foot pain  - Ambulatory referral to Podiatry    5. Hip pain, left  Is obese, Low suspicion for OA based on exam. No back imaging in chart/EMR. If image findings unremarkable, then he might benefit from PT  - XR Hip Left 2 or More VWS     Post Discharge Medication Reconciliation Status: discharge medications reconciled and changed, per note/orders    Return in 4 months (on 12/13/2020), or if symptoms worsen or fail to improve.    Much or all of the text in this note was generated through the use of Dragon Dictate voice-to-text software. Errors in spelling or words which seem out of context are unintentional. Sound alike errors, in particular, may have escaped editing     Chief Complaint   Chief Complaint   Patient presents with     Establish Care     not fasting     Hip Pain     LT side - cracking     Shoulder Pain     RT shoulder - rotator cuff damage        Socioeconomic History     Marital status: Single     Smoking status: Current Every Day Smoker     Types: Cigarettes     Smokeless tobacco: Never Used     Tobacco comment: 4-5 cigarettes daily   Substance and Sexual Activity     Alcohol use: No     Frequency: Never     Comment: none since 2014     Drug  use: No     Past Medical History   Patient Active Problem List   Diagnosis     Spondylolisthesis     Lumbar radiculopathy     Fever     Type 2 diabetes mellitus without complication, without long-term current use of insulin (H)     Idiopathic hypertension     Past Medical History:   Diagnosis Date     Arthritis      Diabetes mellitus (H)      Hyperlipidemia      Hypertension      Low back pain      Major depression      Mood swings      JERMAINE (obstructive sleep apnea)     CPAP     Rotator cuff tear, right      Spondylolisthesis      Past Surgical History  He has a past surgical history that includes Hand surgery (Right, 1993) and Skin graft (Left).  Family History   Problem Relation Age of Onset     Liver disease Mother         from alcohol     Heart attack Father         MI killed him at 69        History of Present Illness   This 55 y.o. old male.  Seen today to establish care. History pertinent for hypertension, diabetes, recent spinal fusion in 5/2019, tobacco use disorder, hemorrhoids. [facility] and perceived tongue swelling.  No angioedema appreciated on exam and did have a small area of discoloration on the lower right side of the gingival mucosa  That has been present for several months.  Given Orajel for pain relief and recommended to follow-up with ENT as an outpatient.    Has not yet seen ENT, but the buccal lesion has now gone. States oragel took in a way, only after a few days of use. Does not think he needs to see ENT. States he has been out of the [facility] since 5/2020. Lives in [place] in a sober house, with intent to be there for at least 6 months. Takes classes from the sober house, where he just arrived there in June. Sees counselor. Abstaining from alcohol. Plans to go on a diet. He is not fasting today. Wants to implement a high protein, low fat, low sodium diet. Purchases his own food.   Adamant he does not have diabetes or hypertension, however he is on  clonidine (for depression and mood swings). From [facility] and has family on the [place] of [place] that work at . Has 6 children (3 sons, 3 daughters, 10 grandchildren). Unmarried. Feels safe at home. Smokes 3-4 cigarettes a day. No alcohol,and drinks 4 cups of coffee a day.  Reports a history of bone spurs on the right sole and that he broke the bone in the bottom aspect of the first MTP.   States he had a colonoscopy. States [facility] in [facility]. That he had a colonoscopy, which was [place]. Does not remember when it was done, but states the findings were normal. Having 0-1 BM a day. Endorses constipation and that he takes docusate sodium, 1 pill per day.   regarding his back, he notes that it feels a lot better, but with his left hip cracks with rotation, it hurts deeply.   For his back pain, had been taking naproxen. Has tried indomethicin. States it usually 8/10 in severity, but averages at about a 6/10. Has radiating pain in the left posterior thigh pain with standing, walking and bending over.   Right shoulder pain, managed with injections. States he received steroid injections to the joint q 3 months X 3 years. Also endorses tennis elbow on the affected area. Has somewhat worked with therapy.     Review of Systems: A comprehensive review of systems was negative except as noted.     Medications and Allergies   Current Outpatient Medications   Medication Sig Dispense Refill     buPROPion (WELLBUTRIN XL) 300 MG 24 hr tablet Take 300 mg by mouth daily.       calcium polycarbophil (FIBERCON) 625 mg tablet Take 625 mg by mouth daily.       cloNIDine HCL (CATAPRES) 0.3 MG tablet Take 0.3 mg by mouth daily.       LATUDA 40 mg Tab tablet Take 40 mg by mouth daily.       metFORMIN (GLUCOPHAGE) 1000 MG tablet Take 1,000 mg by mouth daily with breakfast.              miscellaneous medical supply [facility] [place]AP machine for home use at pressure 8-12cm H20,  "full face mask x1/3month with a full face cushion x1/mo       acetaminophen (TYLENOL) 325 MG tablet Take 2 tablets (650 mg total) by mouth every 6 (six) hours as needed for pain.  0     STOOL SOFTENER 100 mg capsule        venlafaxine (EFFEXOR) 100 MG tablet Take 100 mg by mouth 3 (three) times a day.       No current facility-administered medications for this visit.      No Known Allergies     Family and Social History   Family History   Problem Relation Age of Onset     Liver disease Mother         from alcohol     Heart attack Father         MI killed him at 69        Social History     Tobacco Use     Smoking status: Current Every Day Smoker     Types: Cigarettes     Smokeless tobacco: Never Used     Tobacco comment: 4-5 cigarettes daily   Substance Use Topics     Alcohol use: No     Frequency: Never     Comment: none since 2014     Drug use: No        Physical Exam   /76 (Patient Site: Right Arm, Patient Position: Sitting, Cuff Size: Adult Regular)   Pulse 82   Ht 5' 10\" (1.778 m)   Wt (!) 236 lb (107 kg)   SpO2 96%   BMI 33.86 kg/m    GENERAL APPEARANCE: Pleasant, nontoxic-appearing, NAD, alert and oriented x4  EYES: Eyelids, conjunctiva, and sclera were normal. Pupils were normal.   HEAD, EARS, NOSE, MOUTH, AND THROAT: Head was normal.    NECK: Neck appearance was normal.    RESPIRATORY: Bilaterally with no crackles, wheezing or rhonchi  CARDIOVASCULAR: Regular S1 and S2.  Radial pulses intact.  No edema.  GASTROINTESTINAL: NABS, soft, nontender, nondistended, no hepatomegaly  MUSCULOSKELETAL: Skeletal configuration was normal and muscle mass was normal for age. Joint appearance was overall normal. Negative SIM maneuver bilaterally.   SKIN/HAIR/NAILS: Skin color was normal.  No maceration/ulcers in between toes or on shin/feet. Monofilament sensation intact. Hair and nails were normal. Callused fissure on the right sole of the foot, just before the first MTP.  NEUROLOGIC: Alert and oriented to " person, place, time, and circumstance. Speech was normal. Cranial nerves were grossly normal. Motor strength was normal for age   PSYCHIATRIC:  Mood and affect were normal and the patient had normal recent and remote memory. The patient's judgment and insight were normal.     Additional Information   Time: total time spent with the patient was 45 minutes of which >50% was spent in counseling and coordination of care     Abdifatah Ashby MD  Internal Medicine  Contact me at [phone]

## 2021-06-10 NOTE — TELEPHONE ENCOUNTER
Who is calling:  Patient  Reason for Call:  Pt calling to confirm appointment, and get directions.  Writer provided information.  Date of last appointment with primary care: N/A  Okay to leave a detailed message: No

## 2021-06-11 NOTE — TELEPHONE ENCOUNTER
Pt was referred to podiatry in August. He had other medical issues that he needed to be seen for first. Appt. Made to see Dr. Hammonds for High arches, limited foot support in the right foot on 10/1.

## 2021-06-18 NOTE — PATIENT INSTRUCTIONS - HE
Patient Instructions by Abdifatah Ashby MD at 8/13/2020  1:40 PM     Author: Abdifatah Ashby MD Service: -- Author Type: Physician    Filed: 8/13/2020  2:33 PM Encounter Date: 8/13/2020 Status: Addendum    : Abdifatah Ashby MD (Physician)    Related Notes: Original Note by Abdifatah Ashby MD (Physician) filed at 8/13/2020  2:29 PM       Patient Education     Established High Blood Pressure     High blood pressure (hypertension) is a chronic disease. Often, healthcare providers dont know what causes it. But it can be caused by certain health conditions and medicines.  If you have high blood pressure, you may not have any symptoms. If you do have symptoms, they may include headache, dizziness, changes in your vision, chest pain, and shortness of breath. But even without symptoms, high blood pressure thats not treated raises your risk for heart attack, heart failure, and stroke. High blood pressure is a serious health risk and shouldnt be ignored.  Blood pressure measurements are given as 2 numbers. Systolic blood pressure is the upper number. This is the pressure when the heart contracts. Diastolic blood pressure is the lower number. This is the pressure when the heart relaxes between beats. You will see your blood pressure readings written together. For example, a person with a systolic pressure of 118 and a diastolic pressure of 78 will have 118/78 written in the medical record.  Blood pressure is categorized as normal, elevated, or stage 1 or stage 2 high blood pressure:    Normal blood pressure is systolic of less than 120 and diastolic of less than 80 (120/80)    Elevated blood pressure is systolic of 120 to 129 and diastolic less than 80    Stage 1 high blood pressure is systolic is 130 to 139 or diastolic between 80 to 89    Stage 2 high blood pressure is when systolic is 140 or higher or the diastolic is 90 or higher  Home care  If you have high blood pressure,  follow these home care guidelines to help lower your blood pressure. If you are taking medicines for high blood pressure, these methods may reduce or end your need for medicines in the future.    Start a weight-loss program if you are overweight.    Cut back on how much salt you get in your diet. Heres how to do this:  ? Dont eat foods that have a lot of salt. These include olives, pickles, smoked meats, and salted potato chips.  ? Dont add salt to your food at the table.  ? Use only small amounts of salt when cooking.    Start an exercise program. Talk with your healthcare provider about the type of exercise program that would be best for you. It doesn't have to be hard. Even brisk walking for 20 minutes 3 times a week is a good form of exercise.    Dont take medicines that stimulate the heart. This includes many over-the-counter cold and sinus decongestant pills and sprays, as well as diet pills. Check the warnings about high blood pressure on the label. Before buying any over-the-counter medicines or supplements, always ask the pharmacist about the product's potential interaction with your high blood pressure and your high blood pressure medicines.    Limit how much caffeine you get in your diet. Switch to caffeine-free products.    Stop smoking. If you are a long-time smoker, this can be hard. Talk to your healthcare provider about medicines and nicotine replacement options to help you. Also, enroll in a stop-smoking program to make it more likely that you will quit for good.    Learn how to handle stress. This is an important part of any program to lower blood pressure. Learn about relaxation methods like meditation, yoga, or biofeedback.    If your provider prescribed medicines, take them exactly as directed. Missing doses may cause your blood pressure get out of control.    If you miss a dose or doses, check with your healthcare provider or pharmacist about what to do.    Consider buying an automatic blood  pressure machine to check your blood pressure at home. Ask your provider for a recommendation. You can get one of these at most pharmacies.     The American Heart Association recommends the following guidelines for home blood pressure monitoring:    Don't smoke or drink coffee for 30 minutes before taking your blood pressure.    Go to the bathroom before the test.    Relax for 5 minutes before taking the measurement.    Sit with your back supported (don't sit on a couch or soft chair); keep your feet on the floor uncrossed. Place your arm on a solid flat surface (like a table) with the upper part of the arm at heart level. Place the middle of the cuff directly above the bend of the elbow. Check the monitor's instruction manual for an illustration.    Take multiple readings. When you measure, take 2 to 3 readings one minute apart and record all of the results.    Take your blood pressure at the same time every day, or as your healthcare provider recommends.    Record the date, time, and blood pressure reading.    Take the record with you to your next medical appointment. If your blood pressure monitor has a built-in memory, simply take the monitor with you to your next appointment.    Call your provider if you have several high readings. Don't be frightened by a single high blood pressure reading, but if you get several high readings, check in with your healthcare provider.    Note: When blood pressure reaches a systolic (top number) of 180 or higher OR diastolic (bottom number) of 110 or higher, seek emergency medical treatment.  Follow-up care  You will need to see your healthcare provider regularly. This is to check your blood pressure and to make changes to your medicines. Make a follow-up appointment as directed. Bring the record of your home blood pressure readings to the appointment.  When to seek medical advice  Call your healthcare provider right away if any of these occur:    Blood pressure reaches a  systolic (upper number) of 180 or higher OR a diastolic (bottom number) of 110 or higher    Chest pain or shortness of breath    Severe headache    Throbbing or rushing sound in the ears    Nosebleed    Sudden severe pain in your belly (abdomen)    Extreme drowsiness, confusion, or fainting    Dizziness or spinning sensation (vertigo)    Weakness of an arm or leg or one side of the face    You have problems speaking or seeing

## 2021-06-19 NOTE — LETTER
Letter by Gigi Del Valle MD at      Author: Gigi Del Valle MD Service: -- Author Type: --    Filed:  Encounter Date: 4/2/2019 Status: (Other)         April 2, 2019     Patient: Lucas Lim Sr.   YOB: 1965   Date of Visit: 4/2/2019       To Whom It May Concern:    Lucas Lim was diagnosed with L5-S1 spondylolisthesis and needs a lumbar fusion. His surgery has been moved from 5/7/2019 to 5/21/2019.    If you have any questions or concerns, please don't hesitate to call.    Sincerely,        Electronically signed by Gigi Del Valle MD

## 2021-06-19 NOTE — LETTER
Letter by Gigi Del Valle MD at      Author: Gigi Del Valle MD Service: -- Author Type: --    Filed:  Encounter Date: 4/1/2019 Status: (Other)       Dear Mr. Lim,    This letter will help in preparation for your upcoming surgery. Please contact us with any additional questions you may have regarding your surgery. Contact information for your surgery scheduler:   Naun Goins, and [place]: [phone] ~ [address]: [phone] ~ [place]    You are scheduled for: Anterior Lumbar Interbody Fusion and Posterior Lateral Fusion and Instrumentation.  With: Dr. Gigi Del Valle and Dr. Matthew Monteiro  Date/Time: Tuesday, May 21, 2019 at 9:30 am (time subject to change)  Location: [place] [address]    Check in at the Welcome Desk inside the main doors of the \[facility]\"". An escort will take you to the surgery waiting area. A nurse from [facility] (surgery admit unit) at the \[facility]\"" will call you with your exact arrival time to the hospital - typically one-and-a-half to two-and-a-half hours prior to your scheduled surgery time.     In the event of an emergency surgery case, there may be an adjustment to your start time for surgery.     PREPARING FOR YOUR SURGERY    *Pre-op Physical: Done at [facility]. Please have the LABS completed at this appointment as well. Orders for the labs have been faxed with this letter.     *Please discuss the necessity of receiving a pneumococcal vaccine prior to surgery at your pre-op physical. Recommended for all patients over the age of 65, or based on certain medical conditions.     *After the pre-op physical is complete, please have your clinic fax the visit note to your surgery scheduler at [phone].    *Pre-op Lab Work: Drawn and Resulted BEFORE MAY 17, 2019.     *CPAP Machine: Please bring with you day of surgery. (IF APPLICABLE)    *Readiness Visit: Dr. Del Valle's nurse will  call with Pre-surgery instructions before the procedure.     *Bring your images on disk or film to your Readiness Visit so we can have them loaded into our system and available for your surgery, if they have not previously been brought to our office. Failure to bring your images to our office will result in cancellation of your surgery.     *Ensure that you have completed your pre-op physical, along with any other necessary tests/appointments (listed above), prior to your Readiness Visit.       ADDITIONAL INFORMATION REGARDING YOUR SURGERY    Medications    Bring a list ALL of your medications, including any over-the-counter vitamins and herbal supplements to your Readiness Visit, and on the day of surgery.    DO NOT bring your medications with you the day of surgery.    Please see attached third sheet for more details on medications/vitamins/herbal supplements that should be discontinued prior to your surgery date.     If you are unsure if you should discontinue a medication/ vitamin/herbal supplement, please call our office and discuss with a nurse.    Continue taking your medications/vitamins/herbal supplements unless they are on the attached list.     Failure to follow the instructions regarding medications/vitamins/herbal supplements will result in cancellation of your surgery.    Day BEFORE Surgery    DO NOT shave near your surgical site. This can cause irritation of the skin    Using a washcloth and provided bottle of Hibiclens, shower the night BEFORE surgery, using a half bottle of Hibiclens to wash your body, avoiding face and genitals. The morning OF surgery, shower and use the second half of the bottle to wash your body, avoiding face and genitals. If you are unable to take a shower the morning of surgery, please discuss your options with the nurse at your readiness visit.     NOTHING  to eat after 11:00 p.m. the night prior to your procedure    CLEAR LIQUIDS: May have the following liquids up to two (2)  hours before your arrival time at the hospital: water, plain black coffee (no cream or milk), plain black tea or plain green tea (no cream or milk), Gatorade or Propel Water.    SMOKING: Stop smoking as far before surgery as possible, or as directed by your surgeon. NO tobacco products of any kind (cigarettes, e-cigarettes, chewing tobacco) beginning at 11:00 p.m. the night prior to your procedure.     ALCOHOL: You should stop drinking alcohol beginning at 11:00 p.m. the night prior to your procedure    Contact our office if you have symptoms of illness such as a fever of 101 or greater, chills, cough, sore throat, or if you develop a rash or any open sore    Day OF Surgery    If youve been instructed to take a medication(s) on the morning of surgery, please take with a very small sip of water.    Wear loose & comfortable clothing and flat shoes, Leave jewelry/valuables at home. If you wear contact lenses, remove them at home and wear glasses. Remove any body piercings. Remove nail polish.     Planning for Discharge    Start planning for your care after discharge as soon as you receive this letter.    If you have not made arrangements to have someone take you home and stay with you for the first 48 hours after discharge, your surgery will be cancelled.      PRE-OPERATIVE MEDICATION INSTRUCTIONS  Review this information with your primary care physician prior to discontinuing any of the medications listed below.  Notify your primary care physician that you have been instructed to discontinue these medications.    TEN (10) Days Prior to Surgery, STOP the Following Medications   [place]  Anacin  Aspirin  Excedrin  Pepto Bismol    **Before taking ANY over-the-counter medications, check the label for Aspirin   Non-steroidal   Anti-inflammatory Medications (NSAIDS)    Celebrex  Diclofenac (Cataflam)  Etodolac (Iodine)  Fenoprofen (Nalfon)  Ibuprofen (Advil, Motrin, Nuprin)  Indomethacin  (Indocin)  Ketoprofen  Ketorolac (Toradol)  Melaxicam (Mobic)  Naproxen (AnaProx, Aleve, Naprosyn)  Relafen (Nabumetone)   Herbal Supplements (this is a partial list of herbals to be discontinued)    Onelia Abdalla  Ephedra  Feverfew  Fish Oil  Flaxseed Oil  Garlic  Gina  Gingko  Ginseng  Goldenseal  Imitrex (Sumatriptan)  Kava  Krill Oil  Licorice  Multi Vitamins  [facility]  Valerian  Vitamin E  Yohimbe   CHECK WITH YOUR PRESCRIBING DOCTOR BEFORE STOPPING ANY BLOOD THINNERS (approximately 7 days prior to surgery)  (Coumadin, Plavix, Platel, Aggrenox, Effient (Prasugrel), Ticlid), Xarelto, and Pradaxa      ALWAYS CHECK WITH YOUR PRESCRIBING DOCTOR REGARDING THE MEDICATIONS LISTED BELOW; RECOMMENDED STOP TIME IS ALSO LISTED      If you are taking Lovenox, discontinue 24 HOURS prior to surgery    If you are taking weight loss medication, discontinue 7 days prior to surgery    If you are taking Metformin or Simvastatin, check with your primary care physician (or whoever has prescribed you this medication) regarding when to discontinue prior to surgery        and traditional parking is located at [facility] at [address] [address]. Validation is done at the Welcome Desk in the [facility].

## 2021-06-22 NOTE — PROGRESS NOTES
Neurosurgery consultation was requested by: Dr. Ryan for evaluation of lumbar stenosis  Pain: presents in the bilateral low back over the past year, recently has become more intense and persistent  Radicular Pain is present: in the left buttock, lateral aspect of thigh and calf - burning ache  Lhermitte sign: denies  Motor complaints: denies  Sensory complaints: paresthesias in the left foot  Gait and balance issues: antalgic gait, shopping cart sign  Bowel or bladder issues: denies incontinence or saddle anesthesia  Duration of SX is: chronic  The symptoms are worse with: standing and walking  The symptoms are better with: sitting and laying  Injury: unknown  Severity is: moderate  Patient has tried the following conservative measures: analgesics  TIMOTEO score is: 74%  Amber Simms RN, CNRN

## 2021-06-22 NOTE — PROGRESS NOTES
Patient is a 53-year-old male.  He is a prisoner.  He complains of back pain down the left leg.  No right leg pain.  He has had the pain for 1 year.  It is refractory to conservative treatment including physical therapy and traction.  He gets weakness in the left leg and numbness in the left leg when he walks.  On past medical history and review of systems he is in good health with no heart disease, lung disease, cancer, or diabetes.  On exam he is wearing an orange jumpsuit and has handcuffs and leg shackles.  His MRI shows spondylolisthesis at L5-S1 with bilateral pars defects.  I think he needs a fusion.  I showed him the pictures.  Plan is to get some oblique views and flexion-extension views and then have him come back to see one of our fusion doctors.  He wants to try gabapentin, which is okay with me.  We gave him a prescription for gabapentin.  Total time 20 minutes, more than 50% spent counseling and/or coordinating care.

## 2021-06-24 NOTE — PROGRESS NOTES
Neurosurgery consultation was requested by: Dr. Magallon  Pain: 10/10  Radicular Pain is present: Back down to left LE  Lhanalisa sign: NO  Motor complaints: denies  Sensory complaints: numbness and tingling when walking  Gait and balance issues: yes, takes small steps without moving his upper body  Bowel or bladder issues: yes, but on medication for this (stool softner and laxative)  Duration of SX is: 1 years (spring 2018)  The symptoms are worse with: walking/exercise  The symptoms are better with: stretching left and right  Injury: unknown  Severity is: constant  Patient has tried the following conservative measures: none  TIMOTEO score is:80%  Jovani Beck LPN

## 2021-06-24 NOTE — PROGRESS NOTES
Dear Dr. Ryan:  I had the pleasure of seeing Lucas Lim in consultation in my neurosurgery clinic at the request of my partner Dr. Kelvin Magallon L4 and L5-S1 spondylolisthesis.  As you are well aware of Lucas has been having low back pain and left radicular pain for a number of years.  This is been refractory to conservative measures.  On imaging with MRI he has a spondylolisthesis at L5-S1 that is 5.5 mm and per report on his flexion-extension x-rays he has a 2.2 cm spondylolisthesis on x-rays.  He denies any bowel or bladder incontinence.    On physical exam patient is very pleasant and appropriate  He is in upper and lower extremity shackles  Speech is clear fluent and appropriate  Face is symmetric throughout the forehead eyes and mouth  Strength is full throughout the upper and lower extremities  Straight leg raise test is positive on the left    Assessment and plan:  Lucas Lim is a 53-year-old with spondylolisthesis and back and lower extremity pain as detailed above at L5-S1.  I discussed his pathology and recommend anterior and posterior and lumbar interbody fusion with instrumentation and BMP at L5-S1.  I reviewed the risks benefits alternatives with him in detail.  He wishes to proceed. I will refer him for a consultation with my colleague Dr. Monteiro for evaluation for surgery since Dr. Monteiro will be doing the exposure and approach.    Gigi Del Valle MD, FAANS

## 2021-07-03 NOTE — ANESTHESIA PREPROCEDURE EVALUATION
Anesthesia Preprocedure Evaluation by Arnie Neves MD at 5/21/2019  8:42 AM     Author: Arnie Neves MD Service: -- Author Type: Physician    Filed: 5/21/2019  9:06 AM Date of Service: 5/21/2019  8:42 AM Status: Addendum    : Arnie Neves MD (Physician)    Related Notes: Original Note by Vincent Sibley MD (Physician) filed at 5/21/2019  8:50 AM       Anesthesia Evaluation      Patient summary reviewed   No history of anesthetic complications     Airway   Mallampati: III  Neck ROM: full   Pulmonary - normal exam   (+) sleep apnea on CPAP, ,                          Cardiovascular - normal exam  Exercise tolerance: > or = 4 METS  (+) hypertension, , hypercholesterolemia,     ECG reviewed (5/16/19: RBBB, LAFB (bifascicular block))        Neuro/Psych    (+) neuromuscular disease,  chronic pain    Endo/Other    (+) diabetes mellitus type 2, arthritis,      GI/Hepatic/Renal       Other findings: Spondylolisthesis, LBP. Mood swings.  5/6:  K 4.2, GFR>60, Hg 13.6, PFA-Col/Epi >300 (likely aspirin effect).      Dental    (+) poor dentition                           Anesthesia Plan  Planned anesthetic: general endotracheal and total IV anesthesia  50 mg ketamine IV on induction.  20 mg methadone IV on induction.  4 grams magnesium IV.  ASA 2   Induction: intravenous   Anesthetic plan and risks discussed with: patient  Anesthesia plan special considerations: video-assisted, antiemetics, dexmedetomidine  Post-op plan: routine recovery

## 2021-08-14 ENCOUNTER — HEALTH MAINTENANCE LETTER (OUTPATIENT)
Age: 56
End: 2021-08-14

## 2021-10-09 ENCOUNTER — HEALTH MAINTENANCE LETTER (OUTPATIENT)
Age: 56
End: 2021-10-09

## 2021-10-13 DIAGNOSIS — N52.9 IMPOTENCE OF ORGANIC ORIGIN: ICD-10-CM

## 2021-10-13 DIAGNOSIS — E11.9 TYPE 2 DIABETES MELLITUS WITHOUT COMPLICATION, WITHOUT LONG-TERM CURRENT USE OF INSULIN (H): ICD-10-CM

## 2021-10-13 DIAGNOSIS — I10 IDIOPATHIC HYPERTENSION: Primary | ICD-10-CM

## 2021-10-13 DIAGNOSIS — Z98.1 S/P LUMBAR FUSION: ICD-10-CM

## 2021-10-13 RX ORDER — ATORVASTATIN CALCIUM 20 MG/1
20 TABLET, FILM COATED ORAL DAILY
Qty: 30 TABLET | Refills: 0 | Status: SHIPPED | OUTPATIENT
Start: 2021-10-13

## 2021-10-13 RX ORDER — CLONIDINE HYDROCHLORIDE 0.3 MG/1
0.3 TABLET ORAL DAILY
Qty: 30 TABLET | Refills: 0 | Status: SHIPPED | OUTPATIENT
Start: 2021-10-13

## 2021-10-13 RX ORDER — LURASIDONE HYDROCHLORIDE 40 MG/1
40 TABLET, FILM COATED ORAL DAILY
Qty: 30 TABLET | Refills: 0 | Status: SHIPPED | OUTPATIENT
Start: 2021-10-13

## 2021-10-13 RX ORDER — BUPROPION HYDROCHLORIDE 300 MG/1
300 TABLET ORAL DAILY
Qty: 30 TABLET | Refills: 0 | Status: SHIPPED | OUTPATIENT
Start: 2021-10-13

## 2021-10-13 RX ORDER — SILDENAFIL 50 MG/1
50 TABLET, FILM COATED ORAL DAILY PRN
Qty: 12 TABLET | Refills: 0 | Status: SHIPPED | OUTPATIENT
Start: 2021-10-13

## 2021-10-13 NOTE — TELEPHONE ENCOUNTER
Please refill RX's   He has 4 or 5 that he needs refilled.   He does not know what they are, he did not have the list with him.   Please use [facility] in [place].      Lyn Stoddard on 10/13/2021 at 10:30 AM

## 2021-10-13 NOTE — TELEPHONE ENCOUNTER
Requested Prescriptions   Pending Prescriptions Disp Refills     atorvastatin (LIPITOR) 20 MG tablet 90 tablet 3     Sig: Take 1 tablet (20 mg) by mouth daily   Last Prescription Date:   2/16/21  Last Fill Qty/Refills:         90, R-3 ([facility])       buPROPion (WELLBUTRIN XL) 300 MG 24 hr tablet       Sig: Take 1 tablet (300 mg) by mouth daily   Historically reported       cloNIDine (CATAPRES) 0.3 MG tablet       Sig: Take 1 tablet (0.3 mg) by mouth daily   Historically reported       lurasidone (LATUDA) 40 MG TABS tablet       Sig: Take 1 tablet (40 mg) by mouth daily   Historically reported       metFORMIN (GLUCOPHAGE) 1000 MG tablet 90 tablet 3     Sig: Take 1 tablet (1,000 mg) by mouth daily (with breakfast)   Last Prescription Date:   12/30/20  Last Fill Qty/Refills:         90, R-3 ([id number], [place])       piroxicam (FELDENE) 20 MG capsule 30 capsule 4     Sig: Take 1 capsule (20 mg) by mouth daily Prn   Last Prescription Date:   12/30/20  Last Fill Qty/Refills:         30, R-4 ([id number], [place])       sildenafil (VIAGRA) 50 MG tablet 12 tablet 3     Sig: Take 1 tablet (50 mg) by mouth daily as needed   Last Prescription Date:   2/16/21  Last Fill Qty/Refills:         12, R-3 ([facility])    Last Office Visit:              2/16/21  Future Office visit:           None (no show x3)    Unable to complete prescription refill per RN Medication Refill Policy. Franchesca Alexandra RN .............. 10/13/2021  10:48 AM

## 2021-10-13 NOTE — TELEPHONE ENCOUNTER
Can scheduling contact patient to set up appt.  Miri Akbar LPN .............10/13/2021     3:36 PM

## 2021-10-18 NOTE — TELEPHONE ENCOUNTER
Patient was informed of needing a med check and labs done for next refill, he states he is going to set up an appointment in [facility] to establish care and get him meds done at that facility since he has moved.    Constance Garcia on 10/18/2021 at 1:40 PM

## 2021-11-18 ENCOUNTER — HOSPITAL ENCOUNTER (EMERGENCY)
Facility: OTHER | Age: 56
Discharge: HOME | End: 2021-11-18
Attending: STUDENT IN AN ORGANIZED HEALTH CARE EDUCATION/TRAINING PROGRAM | Admitting: STUDENT IN AN ORGANIZED HEALTH CARE EDUCATION/TRAINING PROGRAM
Payer: COMMERCIAL

## 2021-11-18 VITALS
HEART RATE: 76 BPM | SYSTOLIC BLOOD PRESSURE: 152 MMHG | WEIGHT: 224 LBS | RESPIRATION RATE: 16 BRPM | OXYGEN SATURATION: 98 % | TEMPERATURE: 97.8 F | DIASTOLIC BLOOD PRESSURE: 100 MMHG | BODY MASS INDEX: 31.36 KG/M2 | HEIGHT: 71 IN

## 2021-11-18 DIAGNOSIS — R31.0 GROSS HEMATURIA: ICD-10-CM

## 2021-11-18 LAB
ALBUMIN SERPL-MCNC: 4.3 G/DL (ref 3.5–5.7)
ALBUMIN UR-MCNC: NEGATIVE MG/DL
ALP SERPL-CCNC: 78 U/L (ref 34–104)
ALT SERPL W P-5'-P-CCNC: 22 U/L (ref 7–52)
ANION GAP SERPL CALCULATED.3IONS-SCNC: 7 MMOL/L (ref 3–14)
APPEARANCE UR: ABNORMAL
AST SERPL W P-5'-P-CCNC: 18 U/L (ref 13–39)
BILIRUB SERPL-MCNC: 0.4 MG/DL (ref 0.3–1)
BILIRUB UR QL STRIP: NEGATIVE
BUN SERPL-MCNC: 11 MG/DL (ref 7–25)
CALCIUM SERPL-MCNC: 9.4 MG/DL (ref 8.6–10.3)
CHLORIDE BLD-SCNC: 106 MMOL/L (ref 98–107)
CO2 SERPL-SCNC: 26 MMOL/L (ref 21–31)
COLOR UR AUTO: YELLOW
CREAT SERPL-MCNC: 1.12 MG/DL (ref 0.7–1.3)
GFR SERPL CREATININE-BSD FRML MDRD: 74 ML/MIN/1.73M2
GLUCOSE BLD-MCNC: 127 MG/DL (ref 70–105)
GLUCOSE UR STRIP-MCNC: NEGATIVE MG/DL
HGB UR QL STRIP: ABNORMAL
KETONES UR STRIP-MCNC: NEGATIVE MG/DL
LEUKOCYTE ESTERASE UR QL STRIP: NEGATIVE
MUCOUS THREADS #/AREA URNS LPF: PRESENT /LPF
NITRATE UR QL: NEGATIVE
PH UR STRIP: 6 [PH] (ref 5–9)
POTASSIUM BLD-SCNC: 4.1 MMOL/L (ref 3.5–5.1)
PROT SERPL-MCNC: 7 G/DL (ref 6.4–8.9)
RBC URINE: >182 /HPF
SODIUM SERPL-SCNC: 139 MMOL/L (ref 134–144)
SP GR UR STRIP: 1.01 (ref 1–1.03)
UROBILINOGEN UR STRIP-MCNC: NORMAL MG/DL
WBC URINE: 0 /HPF

## 2021-11-18 PROCEDURE — 99283 EMERGENCY DEPT VISIT LOW MDM: CPT | Performed by: STUDENT IN AN ORGANIZED HEALTH CARE EDUCATION/TRAINING PROGRAM

## 2021-11-18 PROCEDURE — 36415 COLL VENOUS BLD VENIPUNCTURE: CPT | Performed by: STUDENT IN AN ORGANIZED HEALTH CARE EDUCATION/TRAINING PROGRAM

## 2021-11-18 PROCEDURE — 81001 URINALYSIS AUTO W/SCOPE: CPT | Performed by: STUDENT IN AN ORGANIZED HEALTH CARE EDUCATION/TRAINING PROGRAM

## 2021-11-18 PROCEDURE — 82040 ASSAY OF SERUM ALBUMIN: CPT | Performed by: STUDENT IN AN ORGANIZED HEALTH CARE EDUCATION/TRAINING PROGRAM

## 2021-11-18 ASSESSMENT — MIFFLIN-ST. JEOR: SCORE: 1865.25

## 2021-11-18 NOTE — DISCHARGE INSTRUCTIONS
You were found to have blood in your urine.  At this time the cause is not clear.  Further testing is indicated.    Schedule an appointment to have a special CT scan of your bladder done, this can likely be done tomorrow or Monday.    Also schedule an appointment to follow-up with Dr. Rubi with urology after the CT scan to discuss next steps including possible cystoscopy to further evaluate cause of the bleeding.    Come back to the emergency department if you are unable to urinate, have new severe abdominal or back pain, fevers, or any other acute concerns.

## 2021-11-18 NOTE — ED TRIAGE NOTES
"ED Nursing Triage Note (General)   ________________________________    Lucas Lim is a 56 year old Male that presents to triage private car  With history of  Blood noted in urine when went to the bathroom, no pain associated  reported by patient   Significant symptoms had onset today right before coming in {TBP (!) 144/86   Pulse 87   Temp (!) 96.7  F (35.9  C) (Temporal)   Resp 17   Ht 1.791 m (5' 10.5\")   Wt 101.6 kg (224 lb)   SpO2 99%   BMI 31.69 kg/m  t  Patient appears alert  and oriented, in no acute distress., and cooperative behavior.    GCS Total = 15  Airway: intact  Breathing noted as Normal  Circulation Normal  Skin:  Normal        PRE HOSPITAL PRIOR LIVING SITUATION Alone  "

## 2021-11-18 NOTE — ED PROVIDER NOTES
History     Chief Complaint   Patient presents with     Hematuria     HPI  Lucas Lim is a 56 year old man with history of tobacco use, JERMAINE, T2DM, HTN, lumbar radiculopathy who presents for evaluation of blood in urine. He states he first noticed this shortly before arrival this afternoon.  Subsequently had another episode of green-tinged urine after he provided a sample here in the emergency department.  He has never had blood in his urine before.  He denies any pain, no flank pain or burning with urination or urinary frequency.  No history of trauma.  Is not on any blood thinners or herbals.  No fevers or chills, nausea or vomiting, abdominal pain, or any other complaints.  He states he otherwise feels completely normal.  He reports that he smokes 5 to 10 cigarettes daily and has done so for years.    Allergies:  No Known Allergies    Problem List:    Patient Active Problem List    Diagnosis Date Noted     Rotator cuff arthropathy of right shoulder 12/31/2020     Priority: Medium     S/P lumbar fusion 12/30/2020     Priority: Medium     Idiopathic hypertension 12/28/2020     Priority: Medium     Lumbar radiculopathy 12/28/2020     Priority: Medium     Type 2 diabetes mellitus without complication, without long-term current use of insulin (H) 12/28/2020     Priority: Medium     Spondylolisthesis 05/21/2019     Priority: Medium     Alcoholism (H) 12/31/2009     Priority: Medium     Sleep apnea 12/31/2009     Priority: Medium        Past Medical History:    Past Medical History:   Diagnosis Date     Acquired spondylolisthesis      Arthritis      Arthritis      Chronic low back pain      Diabetes mellitus (H)      Diabetes mellitus (H)      Essential hypertension      Fever and chills      Hyperlipidemia      Hypertension      Low back pain      Lumbar radiculopathy      Major depression      Major depressive disorder with current active episode      Mood swings      Mood swings      JERMAINE (obstructive sleep  "apnea)      JERMAINE (obstructive sleep apnea)      Rotator cuff tear, right      Screening for hyperlipidemia      Spondylolisthesis      Tear of right rotator cuff      Type 2 diabetes mellitus without complication, without long-term current use of insulin (H)        Past Surgical History:    Past Surgical History:   Procedure Laterality Date     HAND SURGERY Right     per care everywhere     HAND SURGERY Right 1993    [facility]IF     SKIN GRAFT Left     ear     SKIN GRAFT, EACH ADDN 100SQCM Left     per care everywhere       Family History:    Family History   Problem Relation Age of Onset     Liver Disease Mother         from alcohol     Myocardial Infarction Father      Coronary Artery Disease Father         MI killed him at 69       Social History:  Marital Status:  Single [1]  Social History     Tobacco Use     Smoking status: Current Every Day Smoker     Packs/day: 0.15     Types: Cigarettes     Smokeless tobacco: Never Used   Substance Use Topics     Alcohol use: Not Currently     Drug use: Never        Medications:    atorvastatin (LIPITOR) 20 MG tablet  buPROPion (WELLBUTRIN XL) 300 MG 24 hr tablet  cloNIDine (CATAPRES) 0.3 MG tablet  lurasidone (LATUDA) 40 MG TABS tablet  metFORMIN (GLUCOPHAGE) 1000 MG tablet  piroxicam (FELDENE) 20 MG capsule  sildenafil (VIAGRA) 50 MG tablet  Respiratory Therapy Supplies (CARET[place] 2 CPAP HOSE [place] [facility] [place] Therapy Supplies (CARETOUCH UNIVERSL CPAP FILTER) MISC          Review of Systems  Please see HPI above for pertinent positives and negatives.  All other systems reviewed and found to be negative.    Physical Exam   BP: (!) 144/86  Pulse: 87  Temp: (!) 96.7  F (35.9  C)  Resp: 17  Height: 179.1 cm (5' 10.5\")  Weight: 101.6 kg (224 lb)  SpO2: 99 %      Physical Exam  Gen: Sitting in chair, alert, nontoxic, no acute distress  HEENT: Normocephalic and atraumatic, mucous members moist, conjunctiva clear  CV: Regular rate and rhythm, appears warm and well-perfused  Pulm: " Normal respiratory effort, no audible wheezing or stridor  Abd: Soft, nontender, nondistended  Skin: No rash or other lesions  MSK: No gross deformities or swelling  Neuro: Alert and oriented x4, no focal deficits    ED Course     ED Course as of 11/18/21 1916   Thu Nov 18, 2021   1547 Urinalysis with large blood and RBC's, no evidence of infection   1746 CMP unremarkable, will proceed with plan for outpatient CT urogram and urology follow-up, low suspicion for glomerular pathology. Patient with asymptomatic hematuria that started today, no concerns for urinary retention, no pain or other complains. Does have smoking history (5-10 cigarettes daily)     Procedures              Critical Care time:  none               Results for orders placed or performed during the hospital encounter of 11/18/21 (from the past 24 hour(s))   UA reflex to Microscopic   Result Value Ref Range    Color Urine Yellow Colorless, Straw, Light Yellow, Yellow    Appearance Urine Cloudy (A) Clear    Glucose Urine Negative Negative mg/dL    Bilirubin Urine Negative Negative    Ketones Urine Negative Negative mg/dL    Specific Gravity Urine 1.010 1.005 - 1.030    Blood Urine Large (A) Negative    pH Urine 6.0 5.0 - 9.0    Protein Albumin Urine Negative Negative mg/dL    Urobilinogen Urine Normal Normal, 2.0 mg/dL    Nitrite Urine Negative Negative    Leukocyte Esterase Urine Negative Negative    RBC Urine >182 (H) <=2 /HPF    WBC Urine 0 <=5 /HPF    Mucus Urine Present (A) None Seen /LPF   Comprehensive metabolic panel   Result Value Ref Range    Sodium 139 134 - 144 mmol/L    Potassium 4.1 3.5 - 5.1 mmol/L    Chloride 106 98 - 107 mmol/L    Carbon Dioxide (CO2) 26 21 - 31 mmol/L    Anion Gap 7 3 - 14 mmol/L    Urea Nitrogen 11 7 - 25 mg/dL    Creatinine 1.12 0.70 - 1.30 mg/dL    Calcium 9.4 8.6 - 10.3 mg/dL    Glucose 127 (H) 70 - 105 mg/dL    Alkaline Phosphatase 78 34 - 104 U/L    AST 18 13 - 39 U/L    ALT 22 7 - 52 U/L    Protein Total 7.0  6.4 - 8.9 g/dL    Albumin 4.3 3.5 - 5.7 g/dL    Bilirubin Total 0.4 0.3 - 1.0 mg/dL    GFR Estimate 74 >60 mL/min/1.73m2       Medications - No data to display    Assessments & Plan (with Medical Decision Making)   56-year-old man with history of tobacco use who presents for evaluation of blood in urine, found to have gross hematuria with blood on UA but no evidence of infection.  No pain component to suggest ureterolithiasis.  CMP was obtained and showed normal albumin and creatinine, low suspicion for glomerular disease.  Given tobacco history concern for possible bladder tumor, patient will need outpatient CT urogram and close urology follow-up.  Patient expressed understanding of plan of care, instructed to return to the emergency department if he develops urinary retention or any other acute concerns.  He is appropriate at the time for discharge with close outpatient follow-up with urology and CT urogram, careful return precautions provided.    ED discharge instructions:  You were found to have blood in your urine.  At this time the cause is not clear.  Further testing is indicated.    Schedule an appointment to have a special CT scan of your bladder done, this can likely be done tomorrow or Monday.    Also schedule an appointment to follow-up with Dr. Rubi with urology after the CT scan to discuss next steps including possible cystoscopy to further evaluate cause of the bleeding.    Come back to the emergency department if you are unable to urinate, have new severe abdominal or back pain, fevers, or any other acute concerns.          I have reviewed the nursing notes.    I have reviewed the findings, diagnosis, plan and need for follow up with the patient.       Discharge Medication List as of 11/18/2021  5:56 PM          Final diagnoses:   Gross hematuria       11/18/2021   [facility] AND [facility]     Wanlawanda Brodie Hernandez MD  11/20/21 0843

## 2021-11-24 ENCOUNTER — HOSPITAL ENCOUNTER (OUTPATIENT)
Dept: CT IMAGING | Facility: OTHER | Age: 56
Discharge: HOME | End: 2021-11-24
Attending: STUDENT IN AN ORGANIZED HEALTH CARE EDUCATION/TRAINING PROGRAM | Admitting: STUDENT IN AN ORGANIZED HEALTH CARE EDUCATION/TRAINING PROGRAM
Payer: COMMERCIAL

## 2021-11-24 DIAGNOSIS — R31.0 GROSS HEMATURIA: ICD-10-CM

## 2021-11-24 PROCEDURE — 250N000011 HC RX IP 250 OP 636: Performed by: STUDENT IN AN ORGANIZED HEALTH CARE EDUCATION/TRAINING PROGRAM

## 2021-11-24 PROCEDURE — 74178 CT ABD&PLV WO CNTR FLWD CNTR: CPT

## 2021-11-24 RX ORDER — IOPAMIDOL 755 MG/ML
129 INJECTION, SOLUTION INTRAVASCULAR ONCE
Status: COMPLETED | OUTPATIENT
Start: 2021-11-24 | End: 2021-11-24

## 2021-11-24 RX ADMIN — IOPAMIDOL 129 ML: 755 INJECTION, SOLUTION INTRAVENOUS at 10:22

## 2022-03-26 ENCOUNTER — HEALTH MAINTENANCE LETTER (OUTPATIENT)
Age: 57
End: 2022-03-26

## 2022-09-17 ENCOUNTER — HEALTH MAINTENANCE LETTER (OUTPATIENT)
Age: 57
End: 2022-09-17

## 2023-01-28 ENCOUNTER — HEALTH MAINTENANCE LETTER (OUTPATIENT)
Age: 58
End: 2023-01-28

## 2023-05-06 ENCOUNTER — HEALTH MAINTENANCE LETTER (OUTPATIENT)
Age: 58
End: 2023-05-06

## 2023-10-07 ENCOUNTER — HEALTH MAINTENANCE LETTER (OUTPATIENT)
Age: 58
End: 2023-10-07

## 2024-05-04 ENCOUNTER — HEALTH MAINTENANCE LETTER (OUTPATIENT)
Age: 59
End: 2024-05-04

## 2024-07-13 ENCOUNTER — HEALTH MAINTENANCE LETTER (OUTPATIENT)
Age: 59
End: 2024-07-13

## 2024-11-30 ENCOUNTER — HEALTH MAINTENANCE LETTER (OUTPATIENT)
Age: 59
End: 2024-11-30

## 2024-12-19 NOTE — TELEPHONE ENCOUNTER
5/24/19 Re-admitted w post op fever.    Patient notified of results and recommendations Patient agrees and request referral placed to [facility] fax [phone].  Please advise

## 2025-07-15 ENCOUNTER — LAB REQUISITION (OUTPATIENT)
Dept: LAB | Facility: CLINIC | Age: 60
End: 2025-07-15

## 2025-07-15 LAB
ALBUMIN SERPL BCG-MCNC: 4.8 G/DL (ref 3.5–5.2)
ALP SERPL-CCNC: 66 U/L (ref 40–150)
ALT SERPL W P-5'-P-CCNC: 23 U/L (ref 0–70)
ANION GAP SERPL CALCULATED.3IONS-SCNC: 16 MMOL/L (ref 7–15)
AST SERPL W P-5'-P-CCNC: 23 U/L (ref 0–45)
BASOPHILS # BLD AUTO: 0 10E3/UL (ref 0–0.2)
BASOPHILS NFR BLD AUTO: 0 %
BILIRUB SERPL-MCNC: 1 MG/DL
BUN SERPL-MCNC: 27.9 MG/DL (ref 8–23)
CALCIUM SERPL-MCNC: 9.3 MG/DL (ref 8.8–10.4)
CHLORIDE SERPL-SCNC: 103 MMOL/L (ref 98–107)
CREAT SERPL-MCNC: 1.69 MG/DL (ref 0.67–1.17)
EGFRCR SERPLBLD CKD-EPI 2021: 46 ML/MIN/1.73M2
EOSINOPHIL # BLD AUTO: 0.1 10E3/UL (ref 0–0.7)
EOSINOPHIL NFR BLD AUTO: 1 %
ERYTHROCYTE [DISTWIDTH] IN BLOOD BY AUTOMATED COUNT: 12.8 % (ref 10–15)
GLUCOSE SERPL-MCNC: 126 MG/DL (ref 70–99)
HCO3 SERPL-SCNC: 14 MMOL/L (ref 22–29)
HCT VFR BLD AUTO: 44.9 % (ref 40–53)
HGB BLD-MCNC: 14.9 G/DL (ref 13.3–17.7)
IMM GRANULOCYTES # BLD: 0 10E3/UL
IMM GRANULOCYTES NFR BLD: 0 %
LYMPHOCYTES # BLD AUTO: 1.8 10E3/UL (ref 0.8–5.3)
LYMPHOCYTES NFR BLD AUTO: 29 %
MCH RBC QN AUTO: 29.9 PG (ref 26.5–33)
MCHC RBC AUTO-ENTMCNC: 33.2 G/DL (ref 31.5–36.5)
MCV RBC AUTO: 90 FL (ref 78–100)
MONOCYTES # BLD AUTO: 1.1 10E3/UL (ref 0–1.3)
MONOCYTES NFR BLD AUTO: 18 %
NEUTROPHILS # BLD AUTO: 3.1 10E3/UL (ref 1.6–8.3)
NEUTROPHILS NFR BLD AUTO: 51 %
NRBC # BLD AUTO: 0 10E3/UL
NRBC BLD AUTO-RTO: 0 /100
PLATELET # BLD AUTO: 190 10E3/UL (ref 150–450)
POTASSIUM SERPL-SCNC: 4.5 MMOL/L (ref 3.4–5.3)
PROT SERPL-MCNC: 7.9 G/DL (ref 6.4–8.3)
RBC # BLD AUTO: 4.98 10E6/UL (ref 4.4–5.9)
SODIUM SERPL-SCNC: 133 MMOL/L (ref 135–145)
WBC # BLD AUTO: 6.1 10E3/UL (ref 4–11)

## 2025-07-15 PROCEDURE — 82310 ASSAY OF CALCIUM: CPT | Performed by: NURSE PRACTITIONER

## 2025-07-15 PROCEDURE — 85004 AUTOMATED DIFF WBC COUNT: CPT | Performed by: NURSE PRACTITIONER
